# Patient Record
Sex: MALE | Race: WHITE | URBAN - METROPOLITAN AREA
[De-identification: names, ages, dates, MRNs, and addresses within clinical notes are randomized per-mention and may not be internally consistent; named-entity substitution may affect disease eponyms.]

---

## 2017-07-19 ENCOUNTER — EMERGENCY (EMERGENCY)
Facility: HOSPITAL | Age: 72
LOS: 1 days | Discharge: NURSING, REHAB OR LONG-TERM CARE | End: 2017-07-19
Attending: EMERGENCY MEDICINE | Admitting: EMERGENCY MEDICINE
Payer: MEDICARE

## 2017-07-19 VITALS
TEMPERATURE: 99 F | SYSTOLIC BLOOD PRESSURE: 102 MMHG | RESPIRATION RATE: 15 BRPM | WEIGHT: 197.98 LBS | HEART RATE: 68 BPM | OXYGEN SATURATION: 93 % | HEIGHT: 68 IN | DIASTOLIC BLOOD PRESSURE: 69 MMHG

## 2017-07-19 VITALS
RESPIRATION RATE: 16 BRPM | SYSTOLIC BLOOD PRESSURE: 149 MMHG | DIASTOLIC BLOOD PRESSURE: 78 MMHG | OXYGEN SATURATION: 93 % | TEMPERATURE: 98 F | HEART RATE: 67 BPM

## 2017-07-19 DIAGNOSIS — G30.8 OTHER ALZHEIMER'S DISEASE: ICD-10-CM

## 2017-07-19 LAB
ALBUMIN SERPL ELPH-MCNC: 3.1 G/DL — LOW (ref 3.3–5)
ALP SERPL-CCNC: 148 U/L — HIGH (ref 30–120)
ALT FLD-CCNC: 30 U/L DA — SIGNIFICANT CHANGE UP (ref 10–60)
ANION GAP SERPL CALC-SCNC: 11 MMOL/L — SIGNIFICANT CHANGE UP (ref 5–17)
APPEARANCE UR: CLEAR — SIGNIFICANT CHANGE UP
AST SERPL-CCNC: 19 U/L — SIGNIFICANT CHANGE UP (ref 10–40)
BASOPHILS # BLD AUTO: 0.2 K/UL — SIGNIFICANT CHANGE UP (ref 0–0.2)
BASOPHILS NFR BLD AUTO: 1.9 % — SIGNIFICANT CHANGE UP (ref 0–2)
BILIRUB SERPL-MCNC: 0.5 MG/DL — SIGNIFICANT CHANGE UP (ref 0.2–1.2)
BILIRUB UR-MCNC: NEGATIVE — SIGNIFICANT CHANGE UP
BUN SERPL-MCNC: 10 MG/DL — SIGNIFICANT CHANGE UP (ref 7–23)
CALCIUM SERPL-MCNC: 8.7 MG/DL — SIGNIFICANT CHANGE UP (ref 8.4–10.5)
CHLORIDE SERPL-SCNC: 101 MMOL/L — SIGNIFICANT CHANGE UP (ref 96–108)
CO2 SERPL-SCNC: 24 MMOL/L — SIGNIFICANT CHANGE UP (ref 22–31)
COLOR SPEC: YELLOW — SIGNIFICANT CHANGE UP
CREAT SERPL-MCNC: 0.95 MG/DL — SIGNIFICANT CHANGE UP (ref 0.5–1.3)
DIFF PNL FLD: NEGATIVE — SIGNIFICANT CHANGE UP
EOSINOPHIL # BLD AUTO: 0.4 K/UL — SIGNIFICANT CHANGE UP (ref 0–0.5)
EOSINOPHIL NFR BLD AUTO: 3.4 % — SIGNIFICANT CHANGE UP (ref 0–6)
GLUCOSE SERPL-MCNC: 114 MG/DL — HIGH (ref 70–99)
GLUCOSE UR QL: NEGATIVE MG/DL — SIGNIFICANT CHANGE UP
HCT VFR BLD CALC: 51.7 % — HIGH (ref 39–50)
HGB BLD-MCNC: 17.1 G/DL — HIGH (ref 13–17)
KETONES UR-MCNC: NEGATIVE — SIGNIFICANT CHANGE UP
LEUKOCYTE ESTERASE UR-ACNC: ABNORMAL
LYMPHOCYTES # BLD AUTO: 1.8 K/UL — SIGNIFICANT CHANGE UP (ref 1–3.3)
LYMPHOCYTES # BLD AUTO: 15.8 % — SIGNIFICANT CHANGE UP (ref 13–44)
MCHC RBC-ENTMCNC: 30.4 PG — SIGNIFICANT CHANGE UP (ref 27–34)
MCHC RBC-ENTMCNC: 33.1 GM/DL — SIGNIFICANT CHANGE UP (ref 32–36)
MCV RBC AUTO: 91.9 FL — SIGNIFICANT CHANGE UP (ref 80–100)
MONOCYTES # BLD AUTO: 1.1 K/UL — HIGH (ref 0–0.9)
MONOCYTES NFR BLD AUTO: 9.9 % — SIGNIFICANT CHANGE UP (ref 2–14)
NEUTROPHILS # BLD AUTO: 8 K/UL — HIGH (ref 1.8–7.4)
NEUTROPHILS NFR BLD AUTO: 69 % — SIGNIFICANT CHANGE UP (ref 43–77)
NITRITE UR-MCNC: NEGATIVE — SIGNIFICANT CHANGE UP
PH UR: 6.5 — SIGNIFICANT CHANGE UP (ref 5–8)
PLATELET # BLD AUTO: 202 K/UL — SIGNIFICANT CHANGE UP (ref 150–400)
POTASSIUM SERPL-MCNC: 4 MMOL/L — SIGNIFICANT CHANGE UP (ref 3.5–5.3)
POTASSIUM SERPL-SCNC: 4 MMOL/L — SIGNIFICANT CHANGE UP (ref 3.5–5.3)
PROT SERPL-MCNC: 7.4 G/DL — SIGNIFICANT CHANGE UP (ref 6–8.3)
PROT UR-MCNC: NEGATIVE MG/DL — SIGNIFICANT CHANGE UP
RBC # BLD: 5.62 M/UL — SIGNIFICANT CHANGE UP (ref 4.2–5.8)
RBC # FLD: 12.8 % — SIGNIFICANT CHANGE UP (ref 10.3–14.5)
SODIUM SERPL-SCNC: 136 MMOL/L — SIGNIFICANT CHANGE UP (ref 135–145)
SP GR SPEC: 1.01 — SIGNIFICANT CHANGE UP (ref 1.01–1.02)
UROBILINOGEN FLD QL: NEGATIVE MG/DL — SIGNIFICANT CHANGE UP
WBC # BLD: 11.6 K/UL — HIGH (ref 3.8–10.5)
WBC # FLD AUTO: 11.6 K/UL — HIGH (ref 3.8–10.5)

## 2017-07-19 PROCEDURE — 99284 EMERGENCY DEPT VISIT MOD MDM: CPT | Mod: 25

## 2017-07-19 PROCEDURE — 70450 CT HEAD/BRAIN W/O DYE: CPT

## 2017-07-19 PROCEDURE — 71045 X-RAY EXAM CHEST 1 VIEW: CPT

## 2017-07-19 PROCEDURE — 80053 COMPREHEN METABOLIC PANEL: CPT

## 2017-07-19 PROCEDURE — 99284 EMERGENCY DEPT VISIT MOD MDM: CPT

## 2017-07-19 PROCEDURE — 71010: CPT | Mod: 26

## 2017-07-19 PROCEDURE — 70450 CT HEAD/BRAIN W/O DYE: CPT | Mod: 26

## 2017-07-19 PROCEDURE — 81001 URINALYSIS AUTO W/SCOPE: CPT

## 2017-07-19 PROCEDURE — 90791 PSYCH DIAGNOSTIC EVALUATION: CPT

## 2017-07-19 PROCEDURE — 85027 COMPLETE CBC AUTOMATED: CPT

## 2017-07-19 RX ORDER — SODIUM CHLORIDE 9 MG/ML
1000 INJECTION INTRAMUSCULAR; INTRAVENOUS; SUBCUTANEOUS ONCE
Qty: 0 | Refills: 0 | Status: DISCONTINUED | OUTPATIENT
Start: 2017-07-19 | End: 2017-07-19

## 2017-07-19 RX ORDER — CIPROFLOXACIN HCL 0.3 %
1 DROPS OPHTHALMIC (EYE) ONCE
Qty: 0 | Refills: 0 | Status: COMPLETED | OUTPATIENT
Start: 2017-07-19 | End: 2017-07-19

## 2017-07-19 RX ADMIN — Medication 1 DROP(S): at 14:57
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
## 2017-07-19 NOTE — ED BEHAVIORAL HEALTH ASSESSMENT NOTE - SUICIDE PROTECTIVE FACTORS
Supportive social network or family/Future oriented/Fear of death or dying due to pain/suffering/Identifies reasons for living
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
## 2017-07-19 NOTE — ED PROVIDER NOTE - PSYCHIATRIC, MLM
Cooperative in ER, expressing agitation with other residents and staff at his assisted living facility.
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
## 2017-07-19 NOTE — ED PROVIDER NOTE - MUSCULOSKELETAL, MLM
Spine appears normal, range of motion is not limited, no muscle or joint tenderness. No clubbing cyanosis or edema

## 2017-07-19 NOTE — ED PROVIDER NOTE - CONSTITUTIONAL, MLM
normal... Well appearing, well nourished, elderly, awake on stretcher, alert and oriented x 2 and in no apparent distress.

## 2017-07-19 NOTE — ED BEHAVIORAL HEALTH ASSESSMENT NOTE - OTHER
[facility] peers and staff records from [facility] some mild confusion

## 2017-07-19 NOTE — ED ADULT NURSE NOTE - PMH
Anxiety    Atrial fibrillation    Cerebrovascular accident (CVA)    Dementia    Depression    Hypertension

## 2017-07-19 NOTE — ED BEHAVIORAL HEALTH ASSESSMENT NOTE - DESCRIPTION
calm and in control    cooperative with tests and procedures Pacemaker    A-fib   S/P CVA   conjunctivitis bilateral eyes , retired , has supportive people in his live

## 2017-07-19 NOTE — ED BEHAVIORAL HEALTH ASSESSMENT NOTE - RISK ASSESSMENT
Pt is calm and in control, and denies any SI or HI.  Pt lives in a structured, supervised environment.  Pt is low risk for harm to self or others

## 2017-07-19 NOTE — ED PROVIDER NOTE - MEDICAL DECISION MAKING DETAILS
70 y/o M of hx with dementia on Eliquis, increasing agitation - check basic labs, urine, chest XR r/o infection, head CT r/o bleeding, may explain behavioral change, psychiatry consultation 70 y/o M of hx with dementia on Eliquis, increasing agitation - check basic labs, urine, chest XR r/o infection, head CT r/o bleeding, may explain behavioral change, psychiatry consultation--pt well , may d/c back home, drops for conjunctivitis

## 2019-06-25 ENCOUNTER — INPATIENT (INPATIENT)
Facility: HOSPITAL | Age: 74
LOS: 3 days | Discharge: NURSING, REHAB OR LONG-TERM CARE | DRG: 884 | End: 2019-06-29
Attending: INTERNAL MEDICINE | Admitting: INTERNAL MEDICINE
Payer: MEDICARE

## 2019-06-25 VITALS
DIASTOLIC BLOOD PRESSURE: 62 MMHG | HEART RATE: 62 BPM | RESPIRATION RATE: 14 BRPM | OXYGEN SATURATION: 97 % | HEIGHT: 68 IN | TEMPERATURE: 98 F | WEIGHT: 184.97 LBS | SYSTOLIC BLOOD PRESSURE: 148 MMHG

## 2019-06-25 DIAGNOSIS — I48.2 CHRONIC ATRIAL FIBRILLATION: ICD-10-CM

## 2019-06-25 DIAGNOSIS — F03.90 UNSPECIFIED DEMENTIA WITHOUT BEHAVIORAL DISTURBANCE: ICD-10-CM

## 2019-06-25 DIAGNOSIS — J22 UNSPECIFIED ACUTE LOWER RESPIRATORY INFECTION: ICD-10-CM

## 2019-06-25 DIAGNOSIS — R45.1 RESTLESSNESS AND AGITATION: ICD-10-CM

## 2019-06-25 PROBLEM — I63.9 CEREBRAL INFARCTION, UNSPECIFIED: Chronic | Status: ACTIVE | Noted: 2017-07-19

## 2019-06-25 PROBLEM — I48.91 UNSPECIFIED ATRIAL FIBRILLATION: Chronic | Status: ACTIVE | Noted: 2017-07-19

## 2019-06-25 LAB
ALBUMIN SERPL ELPH-MCNC: 3.4 G/DL — SIGNIFICANT CHANGE UP (ref 3.3–5)
ALP SERPL-CCNC: 91 U/L — SIGNIFICANT CHANGE UP (ref 30–120)
ALT FLD-CCNC: 20 U/L DA — SIGNIFICANT CHANGE UP (ref 10–60)
ANION GAP SERPL CALC-SCNC: 1 MMOL/L — LOW (ref 5–17)
APPEARANCE UR: CLEAR — SIGNIFICANT CHANGE UP
APTT BLD: 35.2 SEC — SIGNIFICANT CHANGE UP (ref 28.5–37)
AST SERPL-CCNC: 21 U/L — SIGNIFICANT CHANGE UP (ref 10–40)
BACTERIA # UR AUTO: ABNORMAL
BASOPHILS # BLD AUTO: 0.08 K/UL — SIGNIFICANT CHANGE UP (ref 0–0.2)
BASOPHILS NFR BLD AUTO: 0.9 % — SIGNIFICANT CHANGE UP (ref 0–2)
BILIRUB SERPL-MCNC: 0.7 MG/DL — SIGNIFICANT CHANGE UP (ref 0.2–1.2)
BILIRUB UR-MCNC: NEGATIVE — SIGNIFICANT CHANGE UP
BUN SERPL-MCNC: 11 MG/DL — SIGNIFICANT CHANGE UP (ref 7–23)
CALCIUM SERPL-MCNC: 8.8 MG/DL — SIGNIFICANT CHANGE UP (ref 8.4–10.5)
CHLORIDE SERPL-SCNC: 97 MMOL/L — SIGNIFICANT CHANGE UP (ref 96–108)
CO2 SERPL-SCNC: 32 MMOL/L — HIGH (ref 22–31)
COLOR SPEC: YELLOW — SIGNIFICANT CHANGE UP
CREAT SERPL-MCNC: 0.75 MG/DL — SIGNIFICANT CHANGE UP (ref 0.5–1.3)
DIFF PNL FLD: ABNORMAL
EOSINOPHIL # BLD AUTO: 0.3 K/UL — SIGNIFICANT CHANGE UP (ref 0–0.5)
EOSINOPHIL NFR BLD AUTO: 3.4 % — SIGNIFICANT CHANGE UP (ref 0–6)
EPI CELLS # UR: SIGNIFICANT CHANGE UP
GLUCOSE SERPL-MCNC: 122 MG/DL — HIGH (ref 70–99)
GLUCOSE UR QL: NEGATIVE MG/DL — SIGNIFICANT CHANGE UP
HCT VFR BLD CALC: 47.4 % — SIGNIFICANT CHANGE UP (ref 39–50)
HGB BLD-MCNC: 16.2 G/DL — SIGNIFICANT CHANGE UP (ref 13–17)
IMM GRANULOCYTES NFR BLD AUTO: 0.6 % — SIGNIFICANT CHANGE UP (ref 0–1.5)
INR BLD: 1.16 RATIO — SIGNIFICANT CHANGE UP (ref 0.88–1.16)
KETONES UR-MCNC: NEGATIVE — SIGNIFICANT CHANGE UP
LACTATE SERPL-SCNC: 1.1 MMOL/L — SIGNIFICANT CHANGE UP (ref 0.7–2)
LEUKOCYTE ESTERASE UR-ACNC: ABNORMAL
LYMPHOCYTES # BLD AUTO: 1.27 K/UL — SIGNIFICANT CHANGE UP (ref 1–3.3)
LYMPHOCYTES # BLD AUTO: 14.5 % — SIGNIFICANT CHANGE UP (ref 13–44)
MCHC RBC-ENTMCNC: 30.7 PG — SIGNIFICANT CHANGE UP (ref 27–34)
MCHC RBC-ENTMCNC: 34.2 GM/DL — SIGNIFICANT CHANGE UP (ref 32–36)
MCV RBC AUTO: 89.8 FL — SIGNIFICANT CHANGE UP (ref 80–100)
MONOCYTES # BLD AUTO: 0.66 K/UL — SIGNIFICANT CHANGE UP (ref 0–0.9)
MONOCYTES NFR BLD AUTO: 7.5 % — SIGNIFICANT CHANGE UP (ref 2–14)
NEUTROPHILS # BLD AUTO: 6.41 K/UL — SIGNIFICANT CHANGE UP (ref 1.8–7.4)
NEUTROPHILS NFR BLD AUTO: 73.1 % — SIGNIFICANT CHANGE UP (ref 43–77)
NITRITE UR-MCNC: POSITIVE
NRBC # BLD: 0 /100 WBCS — SIGNIFICANT CHANGE UP (ref 0–0)
PH UR: 6 — SIGNIFICANT CHANGE UP (ref 5–8)
PLATELET # BLD AUTO: 216 K/UL — SIGNIFICANT CHANGE UP (ref 150–400)
POTASSIUM SERPL-MCNC: 4.5 MMOL/L — SIGNIFICANT CHANGE UP (ref 3.5–5.3)
POTASSIUM SERPL-SCNC: 4.5 MMOL/L — SIGNIFICANT CHANGE UP (ref 3.5–5.3)
PROT SERPL-MCNC: 7.1 G/DL — SIGNIFICANT CHANGE UP (ref 6–8.3)
PROT UR-MCNC: NEGATIVE MG/DL — SIGNIFICANT CHANGE UP
PROTHROM AB SERPL-ACNC: 12.7 SEC — SIGNIFICANT CHANGE UP (ref 10–12.9)
RBC # BLD: 5.28 M/UL — SIGNIFICANT CHANGE UP (ref 4.2–5.8)
RBC # FLD: 13.6 % — SIGNIFICANT CHANGE UP (ref 10.3–14.5)
RBC CASTS # UR COMP ASSIST: SIGNIFICANT CHANGE UP /HPF (ref 0–4)
SODIUM SERPL-SCNC: 130 MMOL/L — LOW (ref 135–145)
SP GR SPEC: 1.01 — SIGNIFICANT CHANGE UP (ref 1.01–1.02)
UROBILINOGEN FLD QL: NEGATIVE MG/DL — SIGNIFICANT CHANGE UP
WBC # BLD: 8.77 K/UL — SIGNIFICANT CHANGE UP (ref 3.8–10.5)
WBC # FLD AUTO: 8.77 K/UL — SIGNIFICANT CHANGE UP (ref 3.8–10.5)
WBC UR QL: ABNORMAL

## 2019-06-25 PROCEDURE — 71275 CT ANGIOGRAPHY CHEST: CPT | Mod: 26

## 2019-06-25 PROCEDURE — 93010 ELECTROCARDIOGRAM REPORT: CPT

## 2019-06-25 PROCEDURE — 99285 EMERGENCY DEPT VISIT HI MDM: CPT

## 2019-06-25 PROCEDURE — 71045 X-RAY EXAM CHEST 1 VIEW: CPT | Mod: 26

## 2019-06-25 RX ORDER — DONEPEZIL HYDROCHLORIDE 10 MG/1
10 TABLET, FILM COATED ORAL AT BEDTIME
Refills: 0 | Status: DISCONTINUED | OUTPATIENT
Start: 2019-06-25 | End: 2019-06-26

## 2019-06-25 RX ORDER — APIXABAN 2.5 MG/1
5 TABLET, FILM COATED ORAL EVERY 12 HOURS
Refills: 0 | Status: DISCONTINUED | OUTPATIENT
Start: 2019-06-26 | End: 2019-06-29

## 2019-06-25 RX ORDER — CEFTRIAXONE 500 MG/1
1000 INJECTION, POWDER, FOR SOLUTION INTRAMUSCULAR; INTRAVENOUS ONCE
Refills: 0 | Status: COMPLETED | OUTPATIENT
Start: 2019-06-25 | End: 2019-06-25

## 2019-06-25 RX ORDER — QUETIAPINE FUMARATE 200 MG/1
100 TABLET, FILM COATED ORAL
Refills: 0 | Status: DISCONTINUED | OUTPATIENT
Start: 2019-06-25 | End: 2019-06-25

## 2019-06-25 RX ORDER — CEFTRIAXONE 500 MG/1
1000 INJECTION, POWDER, FOR SOLUTION INTRAMUSCULAR; INTRAVENOUS EVERY 24 HOURS
Refills: 0 | Status: DISCONTINUED | OUTPATIENT
Start: 2019-06-26 | End: 2019-06-26

## 2019-06-25 RX ORDER — KETOROLAC TROMETHAMINE 0.5 %
0.5 DROPS OPHTHALMIC (EYE)
Qty: 0 | Refills: 0 | DISCHARGE

## 2019-06-25 RX ORDER — AZITHROMYCIN 500 MG/1
500 TABLET, FILM COATED ORAL EVERY 24 HOURS
Refills: 0 | Status: DISCONTINUED | OUTPATIENT
Start: 2019-06-26 | End: 2019-06-26

## 2019-06-25 RX ORDER — QUETIAPINE FUMARATE 200 MG/1
100 TABLET, FILM COATED ORAL
Refills: 0 | Status: DISCONTINUED | OUTPATIENT
Start: 2019-06-25 | End: 2019-06-26

## 2019-06-25 RX ORDER — ERGOCALCIFEROL 1.25 MG/1
2000 CAPSULE ORAL
Qty: 0 | Refills: 0 | DISCHARGE

## 2019-06-25 RX ORDER — AZITHROMYCIN 500 MG/1
500 TABLET, FILM COATED ORAL ONCE
Refills: 0 | Status: COMPLETED | OUTPATIENT
Start: 2019-06-25 | End: 2019-06-25

## 2019-06-25 RX ORDER — MIRTAZAPINE 45 MG/1
15 TABLET, ORALLY DISINTEGRATING ORAL AT BEDTIME
Refills: 0 | Status: DISCONTINUED | OUTPATIENT
Start: 2019-06-25 | End: 2019-06-29

## 2019-06-25 RX ORDER — KETOROLAC TROMETHAMINE 0.5 %
1 DROPS OPHTHALMIC (EYE) DAILY
Refills: 0 | Status: DISCONTINUED | OUTPATIENT
Start: 2019-06-25 | End: 2019-06-29

## 2019-06-25 RX ORDER — CARVEDILOL PHOSPHATE 80 MG/1
12.5 CAPSULE, EXTENDED RELEASE ORAL EVERY 12 HOURS
Refills: 0 | Status: DISCONTINUED | OUTPATIENT
Start: 2019-06-25 | End: 2019-06-29

## 2019-06-25 RX ORDER — QUETIAPINE FUMARATE 200 MG/1
0 TABLET, FILM COATED ORAL
Qty: 0 | Refills: 0 | DISCHARGE

## 2019-06-25 RX ADMIN — MIRTAZAPINE 15 MILLIGRAM(S): 45 TABLET, ORALLY DISINTEGRATING ORAL at 21:23

## 2019-06-25 RX ADMIN — CARVEDILOL PHOSPHATE 12.5 MILLIGRAM(S): 80 CAPSULE, EXTENDED RELEASE ORAL at 17:02

## 2019-06-25 RX ADMIN — DONEPEZIL HYDROCHLORIDE 10 MILLIGRAM(S): 10 TABLET, FILM COATED ORAL at 21:23

## 2019-06-25 RX ADMIN — Medication 1 DROP(S): at 17:04

## 2019-06-25 RX ADMIN — Medication 1 DROP(S): at 16:59

## 2019-06-25 RX ADMIN — QUETIAPINE FUMARATE 100 MILLIGRAM(S): 200 TABLET, FILM COATED ORAL at 17:02

## 2019-06-25 RX ADMIN — Medication 10 MILLIGRAM(S): at 16:59

## 2019-06-25 RX ADMIN — CEFTRIAXONE 100 MILLIGRAM(S): 500 INJECTION, POWDER, FOR SOLUTION INTRAMUSCULAR; INTRAVENOUS at 11:13

## 2019-06-25 RX ADMIN — AZITHROMYCIN 255 MILLIGRAM(S): 500 TABLET, FILM COATED ORAL at 12:14
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
## 2019-06-25 NOTE — ED ADULT NURSE REASSESSMENT NOTE - NS ED NURSE REASSESS COMMENT FT1
1106- Lab work done and resulted, pt will be started on IV abx. pt to be admitted for UTI awaiting for orders and bed placement pt and  updated in plan fo care verbalizes understanding. ALLY hunter

## 2019-06-25 NOTE — CONSULT NOTE ADULT - SUBJECTIVE AND OBJECTIVE BOX
History of Present Illness: The patient is a 73 year old male with a history of HTN, CVA, AF, ICD, dementia who presents with agitation. The patient is a poor historian. The patient notes an intermittent cough but denies chest pain, shortness of breath.    Past Medical/Surgical History:  HTN, CVA, AF, ICD, dementia    Medications:  Home Medications:  Artificial Tears Plus ophthalmic solution: 1 drop(s) to each affected eye 2 times a day (25 Jun 2019 10:22)  Coreg 12.5 mg oral tablet: 1 tab(s) orally 2 times a day (25 Jun 2019 09:11)  donepezil 10 mg oral tablet: 1 tab(s) orally once a day (at bedtime) (25 Jun 2019 09:11)  Eliquis 2.5 mg oral tablet: 1 tab(s) orally 2 times a day (25 Jun 2019 09:11)  enalapril 10 mg oral tablet: 1 tab(s) orally once a day (25 Jun 2019 09:11)  hydroCHLOROthiazide 12.5 mg oral tablet: 1 tab(s) orally once a day (25 Jun 2019 10:22)  ketorolac ophthalmic: 0.5  to each affected eye (25 Jun 2019 10:22)  mirtazapine 15 mg oral tablet: 1 tab(s) orally once a day (at bedtime) (25 Jun 2019 09:11)  Mobic 7.5 mg oral tablet: 1 tab(s) orally once a day (25 Jun 2019 10:22)  QUEtiapine 100 mg oral tablet: 1 tab(s) orally 2 times a day (25 Jun 2019 10:22)  Vitamin D2: 2000 unit(s) orally once a day (25 Jun 2019 09:11)      Family History: Non-contributory family history of premature cardiovascular atherosclerotic disease    Social History: No tobacco, alcohol or drug use    Review of Systems:  General: No fevers, chills, weight loss or gain  Skin: No rashes, color changes  Cardiovascular: No chest pain, orthopnea  Respiratory: No shortness of breath, cough  Gastrointestinal: No nausea, abdominal pain  Genitourinary: No incontinence, pain with urination  Musculoskeletal: No pain, swelling, decreased range of motion  Neurological: No headache, weakness  Psychiatric: No depression, anxiety  Endocrine: No weight loss or gain, increased thirst  All other systems are comprehensively negative.    Physical Exam:  Vitals:        Vital Signs Last 24 Hrs  T(C): 36.4 (25 Jun 2019 08:53), Max: 36.4 (25 Jun 2019 08:53)  T(F): 97.5 (25 Jun 2019 08:53), Max: 97.5 (25 Jun 2019 08:53)  HR: 62 (25 Jun 2019 08:53) (62 - 62)  BP: 148/62 (25 Jun 2019 08:53) (148/62 - 148/62)  BP(mean): --  RR: 14 (25 Jun 2019 08:53) (14 - 14)  SpO2: 97% (25 Jun 2019 08:53) (97% - 97%)  General: NAD  HEENT: MMM  Neck: No JVD, no carotid bruit  Lungs: CTAB  CV: RRR, nl S1/S2, no M/R/G  Abdomen: S/NT/ND, +BS  Extremities: No LE edema, no cyanosis  Neuro: AAOx3, non-focal  Skin: No rash    Labs:                        16.2   8.77  )-----------( 216      ( 25 Jun 2019 09:37 )             47.4     06-25    130<L>  |  97  |  11  ----------------------------<  122<H>  4.5   |  32<H>  |  0.75    Ca    8.8      25 Jun 2019 09:37    TPro  7.1  /  Alb  3.4  /  TBili  0.7  /  DBili  x   /  AST  21  /  ALT  20  /  AlkPhos  91  06-25        PT/INR - ( 25 Jun 2019 09:37 )   PT: 12.7 sec;   INR: 1.16 ratio         PTT - ( 25 Jun 2019 09:37 )  PTT:35.2 sec    ECG: NSR, ventricular paced

## 2019-06-25 NOTE — H&P ADULT - NSICDXPASTMEDICALHX_GEN_ALL_CORE_FT
PAST MEDICAL HISTORY:  Anxiety     Atrial fibrillation     Cerebrovascular accident (CVA)     Dementia     Depression     Hypertension

## 2019-06-25 NOTE — CONSULT NOTE ADULT - PROBLEM SELECTOR RECOMMENDATION 9
eval for PNA  will check procalcitonin, crp, tsh,   will check CTA chest - eval VTE and PNA - pulm disease  swallow eval - hx of dementia and cva  cxr and labs reviewed with pt and HCP at the bedside  HOB elev  asp prec  oral hygiene  assist with ADL as needed  fall prec  monitor vs and HD and Sat  rocephin and zithro ABX - until more clinical data available, and biomarkers and cx  will follow  robitussin for cough PRN -
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
## 2019-06-25 NOTE — SWALLOW BEDSIDE ASSESSMENT ADULT - ORAL PHASE
Within functional limits Delayed oral transit time/stasis clears given liquid wash/Lingual stasis/Decreased anterior-posterior movement of the bolus

## 2019-06-25 NOTE — ED ADULT NURSE NOTE - NSIMPLEMENTINTERV_GEN_ALL_ED
Implemented All Fall with Harm Risk Interventions:  [place] to call system. Call bell, personal items and telephone within reach. Instruct patient to call for assistance. Room bathroom lighting operational. Non-slip footwear when patient is off stretcher. Physically safe environment: no spills, clutter or unnecessary equipment. Stretcher in lowest position, wheels locked, appropriate side rails in place. Provide visual cue, wrist band, yellow gown, etc. Monitor gait and stability. Monitor for mental status changes and reorient to person, place, and time. Review medications for side effects contributing to fall risk. Reinforce activity limits and safety measures with patient and family. Provide visual clues: red socks.

## 2019-06-25 NOTE — ED PROVIDER NOTE - OBJECTIVE STATEMENT
pt bib ems from [facility] for agitation since overnight. pt denies pain sob, nausea. per aide, no vomiting, diarrhea, cough. no further hx available.  pmd - cain
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
## 2019-06-25 NOTE — ED PROVIDER NOTE - CARDIAC, MLM
Normal rate, regular rhythm.  Heart sounds S1, S2 Normal rate, regular rhythm.  Heart sounds S1, S2. PPM left ACW

## 2019-06-25 NOTE — CONSULT NOTE ADULT - ASSESSMENT
The patient is a 73 year old male with a history of HTN, CVA, AF, ICD, dementia who presents with agitation.    Plan:  - Continue carvedilol 12.5 mg bid  - Continue apixaban 5 mg bid (based on weight and renal function)  - Continue enalapril 10 mg daily  - On ceftriaxone and azithromycin for PNA  - Pulm follow-up

## 2019-06-25 NOTE — H&P ADULT - ATTENDING COMMENTS
60minutes spent on this visit, 50% visit time spent in care co-ordination with other attendings and counselling patient  I have discussed care plan with patient and HCP,expressed understanding of problems treatment and their effect and side effects, alternatives in detail,I have asked if they have any questions and concerns and appropriately addressed them to best of my ability  Reviewed all diagonostic tests, lab results and drug drug interactions, and medications

## 2019-06-26 DIAGNOSIS — F03.91 UNSPECIFIED DEMENTIA WITH BEHAVIORAL DISTURBANCE: ICD-10-CM

## 2019-06-26 LAB
ANION GAP SERPL CALC-SCNC: 5 MMOL/L — SIGNIFICANT CHANGE UP (ref 5–17)
BUN SERPL-MCNC: 14 MG/DL — SIGNIFICANT CHANGE UP (ref 7–23)
CALCIUM SERPL-MCNC: 9 MG/DL — SIGNIFICANT CHANGE UP (ref 8.4–10.5)
CHLORIDE SERPL-SCNC: 100 MMOL/L — SIGNIFICANT CHANGE UP (ref 96–108)
CO2 SERPL-SCNC: 31 MMOL/L — SIGNIFICANT CHANGE UP (ref 22–31)
CREAT SERPL-MCNC: 0.92 MG/DL — SIGNIFICANT CHANGE UP (ref 0.5–1.3)
CRP SERPL-MCNC: 0.32 MG/DL — SIGNIFICANT CHANGE UP (ref 0–0.4)
GLUCOSE SERPL-MCNC: 101 MG/DL — HIGH (ref 70–99)
HCT VFR BLD CALC: 49.4 % — SIGNIFICANT CHANGE UP (ref 39–50)
HGB BLD-MCNC: 16.8 G/DL — SIGNIFICANT CHANGE UP (ref 13–17)
MAGNESIUM SERPL-MCNC: 1.6 MG/DL — SIGNIFICANT CHANGE UP (ref 1.6–2.6)
MCHC RBC-ENTMCNC: 30.6 PG — SIGNIFICANT CHANGE UP (ref 27–34)
MCHC RBC-ENTMCNC: 34 GM/DL — SIGNIFICANT CHANGE UP (ref 32–36)
MCV RBC AUTO: 90 FL — SIGNIFICANT CHANGE UP (ref 80–100)
NRBC # BLD: 0 /100 WBCS — SIGNIFICANT CHANGE UP (ref 0–0)
PLATELET # BLD AUTO: 211 K/UL — SIGNIFICANT CHANGE UP (ref 150–400)
POTASSIUM SERPL-MCNC: 3.8 MMOL/L — SIGNIFICANT CHANGE UP (ref 3.5–5.3)
POTASSIUM SERPL-SCNC: 3.8 MMOL/L — SIGNIFICANT CHANGE UP (ref 3.5–5.3)
PROCALCITONIN SERPL-MCNC: <0.02 NG/ML — SIGNIFICANT CHANGE UP (ref 0.02–0.1)
RBC # BLD: 5.49 M/UL — SIGNIFICANT CHANGE UP (ref 4.2–5.8)
RBC # FLD: 13.8 % — SIGNIFICANT CHANGE UP (ref 10.3–14.5)
SODIUM SERPL-SCNC: 136 MMOL/L — SIGNIFICANT CHANGE UP (ref 135–145)
WBC # BLD: 8.78 K/UL — SIGNIFICANT CHANGE UP (ref 3.8–10.5)
WBC # FLD AUTO: 8.78 K/UL — SIGNIFICANT CHANGE UP (ref 3.8–10.5)

## 2019-06-26 RX ORDER — DONEPEZIL HYDROCHLORIDE 10 MG/1
10 TABLET, FILM COATED ORAL DAILY
Refills: 0 | Status: DISCONTINUED | OUTPATIENT
Start: 2019-06-26 | End: 2019-06-29

## 2019-06-26 RX ORDER — QUETIAPINE FUMARATE 200 MG/1
75 TABLET, FILM COATED ORAL THREE TIMES A DAY
Refills: 0 | Status: DISCONTINUED | OUTPATIENT
Start: 2019-06-26 | End: 2019-06-26

## 2019-06-26 RX ORDER — QUETIAPINE FUMARATE 200 MG/1
100 TABLET, FILM COATED ORAL THREE TIMES A DAY
Refills: 0 | Status: DISCONTINUED | OUTPATIENT
Start: 2019-06-26 | End: 2019-06-29

## 2019-06-26 RX ADMIN — QUETIAPINE FUMARATE 100 MILLIGRAM(S): 200 TABLET, FILM COATED ORAL at 21:30

## 2019-06-26 RX ADMIN — Medication 1 DROP(S): at 17:11

## 2019-06-26 RX ADMIN — APIXABAN 5 MILLIGRAM(S): 2.5 TABLET, FILM COATED ORAL at 06:01

## 2019-06-26 RX ADMIN — Medication 1 DROP(S): at 11:04

## 2019-06-26 RX ADMIN — CARVEDILOL PHOSPHATE 12.5 MILLIGRAM(S): 80 CAPSULE, EXTENDED RELEASE ORAL at 06:01

## 2019-06-26 RX ADMIN — CARVEDILOL PHOSPHATE 12.5 MILLIGRAM(S): 80 CAPSULE, EXTENDED RELEASE ORAL at 17:10

## 2019-06-26 RX ADMIN — QUETIAPINE FUMARATE 100 MILLIGRAM(S): 200 TABLET, FILM COATED ORAL at 06:02

## 2019-06-26 RX ADMIN — Medication 10 MILLIGRAM(S): at 11:04

## 2019-06-26 RX ADMIN — MIRTAZAPINE 15 MILLIGRAM(S): 45 TABLET, ORALLY DISINTEGRATING ORAL at 21:30

## 2019-06-26 RX ADMIN — QUETIAPINE FUMARATE 100 MILLIGRAM(S): 200 TABLET, FILM COATED ORAL at 16:36

## 2019-06-26 RX ADMIN — DONEPEZIL HYDROCHLORIDE 10 MILLIGRAM(S): 10 TABLET, FILM COATED ORAL at 16:36

## 2019-06-26 RX ADMIN — Medication 1 DROP(S): at 06:01

## 2019-06-26 RX ADMIN — APIXABAN 5 MILLIGRAM(S): 2.5 TABLET, FILM COATED ORAL at 17:10

## 2019-06-26 NOTE — PHYSICAL THERAPY INITIAL EVALUATION ADULT - ADDITIONAL COMMENTS
Pt lives at [facility], there are elevators that the pts use to transfer. Pt has assist and uses RW 70% with all ambulation, transfer, ADLs, and work/leisure activity.

## 2019-06-26 NOTE — BEHAVIORAL HEALTH ASSESSMENT NOTE - NSBHCHARTREVIEWVS_PSY_A_CORE FT
Vital Signs Last 24 Hrs  T(C): 36.8 (26 Jun 2019 07:56), Max: 37.2 (25 Jun 2019 17:04)  T(F): 98.3 (26 Jun 2019 07:56), Max: 98.9 (25 Jun 2019 17:04)  HR: 62 (26 Jun 2019 11:13) (61 - 73)  BP: 131/68 (26 Jun 2019 11:13) (91/58 - 165/83)  BP(mean): --  RR: 18 (26 Jun 2019 07:56) (16 - 18)  SpO2: 99% (26 Jun 2019 07:56) (95% - 99%)

## 2019-06-26 NOTE — BEHAVIORAL HEALTH ASSESSMENT NOTE - NSBHCHARTREVIEWINVESTIGATE_PSY_A_CORE FT
Ventricular Rate 62 BPM    Atrial Rate 62 BPM    P-R Interval 196 ms    QRS Duration 158 ms    Q-T Interval 472 ms    QTC Calculation(Bezet) 479 ms    P Axis 44 degrees    R Axis -67 degrees    T Axis 52 degrees    Diagnosis Line Atrial-sensed ventricular-paced rhythm  Abnormal ECG  No previous ECGs available  Confirmed by Korey Lozada MD ([id number]) on 6/26/2019 8:41:44 AM

## 2019-06-26 NOTE — BEHAVIORAL HEALTH ASSESSMENT NOTE - HPI (INCLUDE ILLNESS QUALITY, SEVERITY, DURATION, TIMING, CONTEXT, MODIFYING FACTORS, ASSOCIATED SIGNS AND SYMPTOMS)
Pt is a 73 yr old man from [facility] with PMH of  AFIB, AICD, dementia with behavioural disorder, CVA, HTN, BIB EMS from [facility] for agitation since overnight. Pt has h/o recurrent hospital visits for aggressive behaviour. Pt is currently verbal and pleasant, and cooperative with the interview.  He reports that his sleep and appetite are good, and he is ok with living at [facility].  Pt denies any SI/HI/intent to harm self or others.  Pt was admitted for AMS likely worsening Dementia with behavioural disorder with possible LLL infiltrate, advised CT chest by Pulmonary.  The medical team did address advanced care planning and discussed with the patient and family.

## 2019-06-26 NOTE — BEHAVIORAL HEALTH ASSESSMENT NOTE - NSBHCHARTREVIEWLAB_PSY_A_CORE FT
16.8   8.78  )-----------( 211      ( 26 Jun 2019 07:59 )             49.4   06-26    136  |  100  |  14  ----------------------------<  101<H>  3.8   |  31  |  0.92    Ca    9.0      26 Jun 2019 07:59  Mg     1.6     06-26    TPro  7.1  /  Alb  3.4  /  TBili  0.7  /  DBili  x   /  AST  21  /  ALT  20  /  AlkPhos  91  06-25
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
## 2019-06-26 NOTE — CONSULT NOTE ADULT - CONSULT REASON
Intensive Care Unit   Advanced Practice Exam & Daily Communication Note    Patient Active Problem List   Diagnosis     RDS (respiratory distress syndrome in the )     Duodenal atresia     Malnutrition (H)     Need for observation and evaluation of  for sepsis     Hand anomaly     SGA (small for gestational age)      , gestational age 33 completed weeks     Twin birth     Temperature instability in      Trisomy 21     PICC (peripherally inserted central catheter) in place -2018       Vital Signs:  Temp:  [97.8  F (36.6  C)-98.4  F (36.9  C)] 98.4  F (36.9  C)  Heart Rate:  [131-154] 133  Resp:  [34-61] 34  BP: (59-87)/(44-49) 59/49  SpO2:  [100 %] 100 %    Weight:  Wt Readings from Last 1 Encounters:   18 2.68 kg (5 lb 14.5 oz) (<1 %)*     * Growth percentiles are based on Down Syndrome (0-36 Months) data.         Physical Exam:  General: Resting comfortably in crib. In no acute distress.  HEENT: Normocephalic. Anterior fontanelle large,soft, flat. Sagittal suture slightly  approx 4mm; unchanged from baseline.  Eyes clear of drainage.   Cardiovascular: RRR. No murmur. Peripheral pulses present, and symmetric. Extremities warm. Capillary refill <3 seconds peripherally and centrally.     Respiratory: Breath sounds clear with good aeration bilaterally.  No retractions or nasal flaring noted.   Gastrointestinal: Abdomen full, soft. Active bowel sounds.   Musculoskeletal: Right hand underdeveloped with remnants of digits, palpable bony wrist.  No other gross deformities present.  Skin: Warm, pink. No jaundice or skin breakdown.    Neurologic: Tone and reflexes symmetric and appropriate for gestation.     Parent Communication:    Updated mother at bedside after rounds.     Extended Emergency Contact Information  Primary Emergency Contact: ROSELIA IZQUIERDO  Home Phone: [phone]  Mobile Phone: [phone]  Relation: Mother    Whit Navas Mustapha, APRN  CNP                           .

## 2019-06-26 NOTE — BEHAVIORAL HEALTH ASSESSMENT NOTE - NSBHCONSULTFOLLOWAFTERCARE_PSY_A_CORE FT
pt may be seen at the [facility] or walk in clinic [phone] or may f/u with his outpt  psychiatrist he has seen in the past.

## 2019-06-26 NOTE — CONSULT NOTE ADULT - SUBJECTIVE AND OBJECTIVE BOX
change in mental status,   For changes in mental status suspect most likely this  dementia made worse in hospital setting seroquel 75 tid adjust as needed   history of dementia continue home medications   called contact number no response

## 2019-06-26 NOTE — BEHAVIORAL HEALTH ASSESSMENT NOTE - PRIMARY DX
Dementia in Alzheimer's disease, atypical or mixed type Dementia with behavioral disturbance, unspecified dementia type

## 2019-06-27 LAB
-  AMIKACIN: SIGNIFICANT CHANGE UP
-  AMPICILLIN/SULBACTAM: SIGNIFICANT CHANGE UP
-  AMPICILLIN: SIGNIFICANT CHANGE UP
-  AZTREONAM: SIGNIFICANT CHANGE UP
-  CEFEPIME: SIGNIFICANT CHANGE UP
-  CEFOXITIN: SIGNIFICANT CHANGE UP
-  CEFTRIAXONE: SIGNIFICANT CHANGE UP
-  CIPROFLOXACIN: SIGNIFICANT CHANGE UP
-  ERTAPENEM: SIGNIFICANT CHANGE UP
-  GENTAMICIN: SIGNIFICANT CHANGE UP
-  IMIPENEM: SIGNIFICANT CHANGE UP
-  LEVOFLOXACIN: SIGNIFICANT CHANGE UP
-  MEROPENEM: SIGNIFICANT CHANGE UP
-  NITROFURANTOIN: SIGNIFICANT CHANGE UP
-  PIPERACILLIN/TAZOBACTAM: SIGNIFICANT CHANGE UP
-  TIGECYCLINE: SIGNIFICANT CHANGE UP
-  TOBRAMYCIN: SIGNIFICANT CHANGE UP
-  TRIMETHOPRIM/SULFAMETHOXAZOLE: SIGNIFICANT CHANGE UP
CULTURE RESULTS: SIGNIFICANT CHANGE UP
FOLATE SERPL-MCNC: 13 NG/ML — SIGNIFICANT CHANGE UP
METHOD TYPE: SIGNIFICANT CHANGE UP
ORGANISM # SPEC MICROSCOPIC CNT: SIGNIFICANT CHANGE UP
ORGANISM # SPEC MICROSCOPIC CNT: SIGNIFICANT CHANGE UP
SPECIMEN SOURCE: SIGNIFICANT CHANGE UP
T PALLIDUM AB TITR SER: NEGATIVE — SIGNIFICANT CHANGE UP
TSH SERPL-MCNC: 2.55 UIU/ML — SIGNIFICANT CHANGE UP (ref 0.27–4.2)
VIT B12 SERPL-MCNC: 546 PG/ML — SIGNIFICANT CHANGE UP (ref 232–1245)

## 2019-06-27 RX ADMIN — APIXABAN 5 MILLIGRAM(S): 2.5 TABLET, FILM COATED ORAL at 05:51

## 2019-06-27 RX ADMIN — Medication 1 DROP(S): at 17:57

## 2019-06-27 RX ADMIN — CARVEDILOL PHOSPHATE 12.5 MILLIGRAM(S): 80 CAPSULE, EXTENDED RELEASE ORAL at 17:58

## 2019-06-27 RX ADMIN — DONEPEZIL HYDROCHLORIDE 10 MILLIGRAM(S): 10 TABLET, FILM COATED ORAL at 12:15

## 2019-06-27 RX ADMIN — QUETIAPINE FUMARATE 100 MILLIGRAM(S): 200 TABLET, FILM COATED ORAL at 05:51

## 2019-06-27 RX ADMIN — Medication 1 DROP(S): at 05:50

## 2019-06-27 RX ADMIN — QUETIAPINE FUMARATE 100 MILLIGRAM(S): 200 TABLET, FILM COATED ORAL at 22:08

## 2019-06-27 RX ADMIN — Medication 1 DROP(S): at 12:15

## 2019-06-27 RX ADMIN — APIXABAN 5 MILLIGRAM(S): 2.5 TABLET, FILM COATED ORAL at 17:57

## 2019-06-27 RX ADMIN — CARVEDILOL PHOSPHATE 12.5 MILLIGRAM(S): 80 CAPSULE, EXTENDED RELEASE ORAL at 05:51

## 2019-06-27 RX ADMIN — MIRTAZAPINE 15 MILLIGRAM(S): 45 TABLET, ORALLY DISINTEGRATING ORAL at 22:08

## 2019-06-27 RX ADMIN — Medication 10 MILLIGRAM(S): at 05:51

## 2019-06-27 RX ADMIN — QUETIAPINE FUMARATE 100 MILLIGRAM(S): 200 TABLET, FILM COATED ORAL at 17:58

## 2019-06-27 NOTE — DISCHARGE NOTE NURSING/CASE MANAGEMENT/SOCIAL WORK - NSDCCRNAME_GEN_ALL_CORE_FT
[facility] [phone]. Utilizes [employer] [phone]

## 2019-06-27 NOTE — PROGRESS NOTE BEHAVIORAL HEALTH - NSBHCONSULTFOLLOWAFTERCARE_PSY_A_CORE FT
pt may be seen at the [facility] or walk in clinic [phone] or may f/u with his outpt  psychiatrist he has seen in the past.

## 2019-06-27 NOTE — PROGRESS NOTE BEHAVIORAL HEALTH - SUMMARY
Pt is a 73 yr old man from [facility] with PMH of  AFIB, AICD, dementia with behavioural disorder, CVA, HTN, BIB EMS from [facility] for agitation since overnight. Pt has h/o recurrent hospital visits for aggressive behaviour. Pt is currently verbal and pleasant, and cooperative with the interview.  He reports that his sleep and appetite are good, and he is ok with living at [facility].  Pt denies any SI/HI/intent to harm self or others.  Pt was admitted for AMS likely worsening Dementia with behavioural disorder and pneumonia which was ruled out now.  Pt's Seroquel was increased to 75mg TID, but he is tolerating this dose with no adverse effects and no sedation.  He may be on Seroquel 100mg po TID to simplify the regimen. (Seroquel 100mg is one tablet and may be easier to take. )   Plan:  Change Donepezil to AM instead to hs to avoid worsening sleep disorder.  Increase Seroquel to 100mg po TID.  Continue Remeron 15mg po qhs.

## 2019-06-27 NOTE — DISCHARGE NOTE NURSING/CASE MANAGEMENT/SOCIAL WORK - NSDCDPATPORTLINK_GEN_ALL_CORE
You can access the [facility] Patient Portal, offered by [facility], by registering with the following website: [email or web address]

## 2019-06-27 NOTE — DISCHARGE NOTE NURSING/CASE MANAGEMENT/SOCIAL WORK - CASE MANAGER'S NAME
Tia Garland RN [phone] Tia Garland RN [phone] or [facility] # [phone]

## 2019-06-27 NOTE — PROGRESS NOTE BEHAVIORAL HEALTH - NSBHFUPINTERVALHXFT_PSY_A_CORE
PT is intermittently irritable and angry, but is responsive to support and is able to be calm.  He has been adherent to his medications and is tolerating the increased dose of Seroquel with no excess sedation.

## 2019-06-27 NOTE — PROGRESS NOTE BEHAVIORAL HEALTH - NSBHCHARTREVIEWVS_PSY_A_CORE FT
Vital Signs Last 24 Hrs  T(C): 36.7 (27 Jun 2019 08:30), Max: 36.8 (26 Jun 2019 21:55)  T(F): 98 (27 Jun 2019 08:30), Max: 98.3 (27 Jun 2019 05:30)  HR: 62 (27 Jun 2019 08:30) (61 - 65)  BP: 131/77 (27 Jun 2019 08:30) (113/75 - 141/75)  BP(mean): --  RR: 18 (27 Jun 2019 08:30) (18 - 18)  SpO2: 98% (27 Jun 2019 08:30) (93% - 98%)

## 2019-06-28 LAB
ANION GAP SERPL CALC-SCNC: 10 MMOL/L — SIGNIFICANT CHANGE UP (ref 5–17)
BUN SERPL-MCNC: 16 MG/DL — SIGNIFICANT CHANGE UP (ref 7–23)
CALCIUM SERPL-MCNC: 9.2 MG/DL — SIGNIFICANT CHANGE UP (ref 8.4–10.5)
CHLORIDE SERPL-SCNC: 102 MMOL/L — SIGNIFICANT CHANGE UP (ref 96–108)
CO2 SERPL-SCNC: 24 MMOL/L — SIGNIFICANT CHANGE UP (ref 22–31)
CREAT SERPL-MCNC: 0.8 MG/DL — SIGNIFICANT CHANGE UP (ref 0.5–1.3)
GLUCOSE SERPL-MCNC: 103 MG/DL — HIGH (ref 70–99)
HCT VFR BLD CALC: 48.3 % — SIGNIFICANT CHANGE UP (ref 39–50)
HGB BLD-MCNC: 16.6 G/DL — SIGNIFICANT CHANGE UP (ref 13–17)
MCHC RBC-ENTMCNC: 31.3 PG — SIGNIFICANT CHANGE UP (ref 27–34)
MCHC RBC-ENTMCNC: 34.4 GM/DL — SIGNIFICANT CHANGE UP (ref 32–36)
MCV RBC AUTO: 91 FL — SIGNIFICANT CHANGE UP (ref 80–100)
NRBC # BLD: 0 /100 WBCS — SIGNIFICANT CHANGE UP (ref 0–0)
PLATELET # BLD AUTO: 202 K/UL — SIGNIFICANT CHANGE UP (ref 150–400)
POTASSIUM SERPL-MCNC: 4.2 MMOL/L — SIGNIFICANT CHANGE UP (ref 3.5–5.3)
POTASSIUM SERPL-SCNC: 4.2 MMOL/L — SIGNIFICANT CHANGE UP (ref 3.5–5.3)
RBC # BLD: 5.31 M/UL — SIGNIFICANT CHANGE UP (ref 4.2–5.8)
RBC # FLD: 14.1 % — SIGNIFICANT CHANGE UP (ref 10.3–14.5)
SODIUM SERPL-SCNC: 136 MMOL/L — SIGNIFICANT CHANGE UP (ref 135–145)
WBC # BLD: 8.91 K/UL — SIGNIFICANT CHANGE UP (ref 3.8–10.5)
WBC # FLD AUTO: 8.91 K/UL — SIGNIFICANT CHANGE UP (ref 3.8–10.5)

## 2019-06-28 RX ADMIN — DONEPEZIL HYDROCHLORIDE 10 MILLIGRAM(S): 10 TABLET, FILM COATED ORAL at 11:41

## 2019-06-28 RX ADMIN — QUETIAPINE FUMARATE 100 MILLIGRAM(S): 200 TABLET, FILM COATED ORAL at 21:39

## 2019-06-28 RX ADMIN — CARVEDILOL PHOSPHATE 12.5 MILLIGRAM(S): 80 CAPSULE, EXTENDED RELEASE ORAL at 06:20

## 2019-06-28 RX ADMIN — MIRTAZAPINE 15 MILLIGRAM(S): 45 TABLET, ORALLY DISINTEGRATING ORAL at 21:39

## 2019-06-28 RX ADMIN — Medication 1 DROP(S): at 18:47

## 2019-06-28 RX ADMIN — QUETIAPINE FUMARATE 100 MILLIGRAM(S): 200 TABLET, FILM COATED ORAL at 06:20

## 2019-06-28 RX ADMIN — QUETIAPINE FUMARATE 100 MILLIGRAM(S): 200 TABLET, FILM COATED ORAL at 13:44

## 2019-06-28 RX ADMIN — APIXABAN 5 MILLIGRAM(S): 2.5 TABLET, FILM COATED ORAL at 06:20

## 2019-06-28 RX ADMIN — APIXABAN 5 MILLIGRAM(S): 2.5 TABLET, FILM COATED ORAL at 18:47

## 2019-06-28 RX ADMIN — Medication 1 DROP(S): at 06:25

## 2019-06-28 RX ADMIN — Medication 1 DROP(S): at 11:41

## 2019-06-28 RX ADMIN — Medication 10 MILLIGRAM(S): at 06:20

## 2019-06-28 RX ADMIN — CARVEDILOL PHOSPHATE 12.5 MILLIGRAM(S): 80 CAPSULE, EXTENDED RELEASE ORAL at 17:28

## 2019-06-28 NOTE — SWALLOW BEDSIDE ASSESSMENT ADULT - SWALLOW EVAL: RECOMMENDED FEEDING/EATING TECHNIQUES
position upright (90 degrees)/oral hygiene/maintain upright posture during/after eating for 30 mins/allow for swallow between intakes/crush medication (when feasible)/no straws/small sips/bites
oral hygiene/small sips/bites/allow for swallow between intakes/check mouth frequently for oral residue/pocketing/crush medication (when feasible)/hard swallow w/ each bite or sip/no straws/maintain upright posture during/after eating for 30 mins/position upright (90 degrees)/alternate food with liquid

## 2019-06-28 NOTE — SWALLOW BEDSIDE ASSESSMENT ADULT - SWALLOW EVAL: DIAGNOSIS
1. Functional oral management of puree, solids and thin liquids with adequate collection, transfer and transport. 2. Functional pharyngeal swallow for puree, solids and thin liquids marked by timely swallow trigger with adequate laryngeal elevation upon palpation. No clinical evidence of airway penetration during this assessment.
1- functional oral management given puree, nectar thick and thin liquid textures marked by adequate bolus collection, transfer and transport. 2- mild oral dysphagia given solids marked by delayed bolus collection, transfer and transport. trace lingual residue remained post swallow which pt clears given liquid wash. 3- mild pharyngeal dysphagia given solid, puree, nectar thick liquid and thin liquid textures marked by delayed swallow trigger and reduced hyolaryngeal excursion without any overt s/s of penetration/aspiration noted.

## 2019-06-28 NOTE — SWALLOW BEDSIDE ASSESSMENT ADULT - ASR SWALLOW ASPIRATION MONITOR
oral hygiene/position upright (90Y)/throat clearing/change of breathing pattern/cough/gurgly voice/fever/pneumonia/upper respiratory infection
oral hygiene/position upright (90Y)/pneumonia/gurgly voice/change of breathing pattern/cough/fever/throat clearing/upper respiratory infection

## 2019-06-28 NOTE — SWALLOW BEDSIDE ASSESSMENT ADULT - COMMENTS
Patient seen at chairside, sitting comfortably. Patient alert and cooperative.
Pt was awake and cooperative for a clinical assessment of swallow function this PM. Per charting, pt is a "73 yr old from [facility] with PMH of  AFIB, AICD ANXIETY, dementia with behavioural disorder, CVA, HTN, bib ems from [facility] for agitation since overnight. pt denies pain sob, nausea. per aide, no vomiting, diarrhea, cough. no further hx available. Has H/o recurrent hospital visits for aggressive behaviour. In /62, RR 14, afebrile, confused , X ray chest left basal infiltrate, pt is on eliquis. HCP by bed side, aware of aggressive behaviour of patient. Admitted for AMS likely worsening Dementia with behavioural disorder with possible LLL infiltrate, advised CT chest by Pulmonary Dr Trivedi, Neuro and psych consult called, will continue home meds and empirical abx, biomarkers for PNA ordered." CXR revealed "Questionable left base infiltrate. Follow-up study is recommended as clinically warranted. "

## 2019-06-28 NOTE — SWALLOW VFSS/MBS ASSESSMENT ADULT - COMMENTS
Patient with reports of increased back pain with transition into cine chair with maximum assistance compromising adequate positioning for evaluation. Patient with continued c/o back pain  refused exam at this time.   Discussed with Dr. Mendenhall. Will re-assess patient at bedside .

## 2019-06-28 NOTE — SWALLOW BEDSIDE ASSESSMENT ADULT - ASR SWALLOW RECOMMEND DIAG
VFSS/MBS/MBS as out-patient for objective assessment of pharyngeal swallow
VFSS/MBS/if concerned for PNA given recent chest imaging, though no overt s/s of penetration/aspiration noted during today's clinical assessment

## 2019-06-28 NOTE — SWALLOW VFSS/MBS ASSESSMENT ADULT - SLP PERTINENT HISTORY OF CURRENT PROBLEM
Patient with history of dysphagia, is familiar to this department from recent bedside swallow evaluation , see full report for details

## 2019-06-28 NOTE — SWALLOW BEDSIDE ASSESSMENT ADULT - ADDITIONAL RECOMMENDATIONS
This department to follow as schedule permits re: diet tolerance.
this dept to continue to f/u as schedule permits

## 2019-06-29 VITALS
RESPIRATION RATE: 18 BRPM | DIASTOLIC BLOOD PRESSURE: 70 MMHG | HEART RATE: 58 BPM | TEMPERATURE: 99 F | OXYGEN SATURATION: 94 % | SYSTOLIC BLOOD PRESSURE: 132 MMHG

## 2019-06-29 PROCEDURE — 71275 CT ANGIOGRAPHY CHEST: CPT

## 2019-06-29 PROCEDURE — 97530 THERAPEUTIC ACTIVITIES: CPT

## 2019-06-29 PROCEDURE — 81001 URINALYSIS AUTO W/SCOPE: CPT

## 2019-06-29 PROCEDURE — 99285 EMERGENCY DEPT VISIT HI MDM: CPT

## 2019-06-29 PROCEDURE — 82746 ASSAY OF FOLIC ACID SERUM: CPT

## 2019-06-29 PROCEDURE — 93005 ELECTROCARDIOGRAM TRACING: CPT

## 2019-06-29 PROCEDURE — 85730 THROMBOPLASTIN TIME PARTIAL: CPT

## 2019-06-29 PROCEDURE — 84145 PROCALCITONIN (PCT): CPT

## 2019-06-29 PROCEDURE — 80053 COMPREHEN METABOLIC PANEL: CPT

## 2019-06-29 PROCEDURE — 83605 ASSAY OF LACTIC ACID: CPT

## 2019-06-29 PROCEDURE — 97116 GAIT TRAINING THERAPY: CPT

## 2019-06-29 PROCEDURE — 87086 URINE CULTURE/COLONY COUNT: CPT

## 2019-06-29 PROCEDURE — 83735 ASSAY OF MAGNESIUM: CPT

## 2019-06-29 PROCEDURE — 82607 VITAMIN B-12: CPT

## 2019-06-29 PROCEDURE — 36415 COLL VENOUS BLD VENIPUNCTURE: CPT

## 2019-06-29 PROCEDURE — 87040 BLOOD CULTURE FOR BACTERIA: CPT

## 2019-06-29 PROCEDURE — 85027 COMPLETE CBC AUTOMATED: CPT

## 2019-06-29 PROCEDURE — 86780 TREPONEMA PALLIDUM: CPT

## 2019-06-29 PROCEDURE — 86140 C-REACTIVE PROTEIN: CPT

## 2019-06-29 PROCEDURE — 85610 PROTHROMBIN TIME: CPT

## 2019-06-29 PROCEDURE — 71045 X-RAY EXAM CHEST 1 VIEW: CPT

## 2019-06-29 PROCEDURE — 84443 ASSAY THYROID STIM HORMONE: CPT

## 2019-06-29 PROCEDURE — 87186 SC STD MICRODIL/AGAR DIL: CPT

## 2019-06-29 PROCEDURE — 97161 PT EVAL LOW COMPLEX 20 MIN: CPT

## 2019-06-29 PROCEDURE — 80048 BASIC METABOLIC PNL TOTAL CA: CPT

## 2019-06-29 RX ORDER — MIRTAZAPINE 45 MG/1
1 TABLET, ORALLY DISINTEGRATING ORAL
Qty: 30 | Refills: 0
Start: 2019-06-29 | End: 2019-07-28

## 2019-06-29 RX ORDER — DONEPEZIL HYDROCHLORIDE 10 MG/1
1 TABLET, FILM COATED ORAL
Qty: 30 | Refills: 0
Start: 2019-06-29 | End: 2019-07-28

## 2019-06-29 RX ORDER — MELOXICAM 15 MG/1
1 TABLET ORAL
Qty: 0 | Refills: 0 | DISCHARGE

## 2019-06-29 RX ORDER — APIXABAN 2.5 MG/1
1 TABLET, FILM COATED ORAL
Qty: 0 | Refills: 0 | DISCHARGE

## 2019-06-29 RX ORDER — APIXABAN 2.5 MG/1
1 TABLET, FILM COATED ORAL
Qty: 30 | Refills: 0
Start: 2019-06-29 | End: 2019-07-13

## 2019-06-29 RX ORDER — MIRTAZAPINE 45 MG/1
1 TABLET, ORALLY DISINTEGRATING ORAL
Qty: 0 | Refills: 0 | DISCHARGE

## 2019-06-29 RX ORDER — QUETIAPINE FUMARATE 200 MG/1
1 TABLET, FILM COATED ORAL
Qty: 90 | Refills: 0
Start: 2019-06-29 | End: 2019-07-28

## 2019-06-29 RX ORDER — CARVEDILOL PHOSPHATE 80 MG/1
1 CAPSULE, EXTENDED RELEASE ORAL
Qty: 60 | Refills: 0
Start: 2019-06-29 | End: 2019-07-28

## 2019-06-29 RX ORDER — DONEPEZIL HYDROCHLORIDE 10 MG/1
1 TABLET, FILM COATED ORAL
Qty: 0 | Refills: 0 | DISCHARGE

## 2019-06-29 RX ORDER — QUETIAPINE FUMARATE 200 MG/1
1 TABLET, FILM COATED ORAL
Qty: 0 | Refills: 0 | DISCHARGE

## 2019-06-29 RX ADMIN — QUETIAPINE FUMARATE 100 MILLIGRAM(S): 200 TABLET, FILM COATED ORAL at 13:37

## 2019-06-29 RX ADMIN — APIXABAN 5 MILLIGRAM(S): 2.5 TABLET, FILM COATED ORAL at 05:46

## 2019-06-29 RX ADMIN — Medication 1 DROP(S): at 12:09

## 2019-06-29 RX ADMIN — CARVEDILOL PHOSPHATE 12.5 MILLIGRAM(S): 80 CAPSULE, EXTENDED RELEASE ORAL at 05:46

## 2019-06-29 RX ADMIN — QUETIAPINE FUMARATE 100 MILLIGRAM(S): 200 TABLET, FILM COATED ORAL at 05:45

## 2019-06-29 RX ADMIN — Medication 10 MILLIGRAM(S): at 05:46

## 2019-06-29 NOTE — PROGRESS NOTE ADULT - ATTENDING COMMENTS
60minutes spent on this visit, 50% visit time spent in care co-ordination with other attendings and counselling patient  I have discussed care plan with patient and HCP,expressed understanding of problems treatment and their effect and side effects, alternatives in detail,I have asked if they have any questions and concerns and appropriately addressed them to best of my ability  Reviewed all diagonostic tests, lab results and drug drug interactions, and medications
60minutes spent on this visit, 50% visit time spent in care co-ordination with other attendings and counselling patient  I have discussed care plan with patient and HCP,expressed understanding of problems treatment and their effect and side effects, alternatives in detail,I have asked if they have any questions and concerns and appropriately addressed them to best of my ability  Reviewed all diagonostic tests, lab results and drug drug interactions, and medications
45 minutes spent on this visit, 50% visit time spent in care co-ordination with other attendings and counselling patient  I have discussed care plan with patient and HCP,expressed understanding of problems treatment and their effect and side effects, alternatives in detail,I have asked if they have any questions and concerns and appropriately addressed them to best of my ability  Reviewed all diagonostic tests, lab results and drug drug interactions, and medications
45 minutes spent on this visit, 50% visit time spent in care co-ordination with other attendings and counselling patient  I have discussed care plan with patient and HCP,expressed understanding of problems treatment and their effect and side effects, alternatives in detail,I have asked if they have any questions and concerns and appropriately addressed them to best of my ability  Reviewed all diagonostic tests, lab results and drug drug interactions, and medications

## 2019-06-29 NOTE — PROGRESS NOTE ADULT - PROBLEM SELECTOR PLAN 2
rocephine zithro ct chest on admission PNA PE ruled ou DC abx

## 2019-06-29 NOTE — PROGRESS NOTE ADULT - PROBLEM SELECTOR PLAN 1
dementia  poor dentition  HTN  no evidence of PNA on ct chest or Biomarkers - will dc ABX  tolerating room air  oral hygiene  skin care  pt is DNR - MOLST - GOC - spoke with HCP  will follow and monitor  assist with ADL
likely worsening dementia, r/o infection cont  psych meds
psych follow up noted  on room air  GOC documented  pt is DNR DNI  no evidence of PNA  CT chest reviewed  am labs pending  oral hygiene  skin care  assist with ADL as needed  dc planning  out of bed - fall prec - nutrition -   will follow  overall better -   cx report - noted -
seen and examined, vs and meds reviewed, labs reviewed, am labs pending  psych follow up noted  no evidence of PNA  dc planning under way  on Eliis for AF  cardio follow up  assist with ADL as needed  fall prec  nutrition  pt is DNR DNI
likely worsening dementia, r/o infection cont  psych meds
seen and examined  vs and meds reviewed  labs reviewed  CM noted reviewed  planned for [facility] return  no PNA  imaging and labs reviewed  pt is DNR DNI  fall prec  assist with ADL as needed  oral hygiene  dental hygiene  will follow
likely worsening dementia, r/o infection cont  psych meds, seroquel increased
likely worsening dementia, r/o infection cont  psych meds

## 2019-06-29 NOTE — PROGRESS NOTE ADULT - SUBJECTIVE AND OBJECTIVE BOX
Date/Time Patient Seen:  		  Referring MD:   Data Reviewed	       Patient is a 73y old  Male who presents with a chief complaint of AMS (25 Jun 2019 13:34)      Subjective/HPI     PAST MEDICAL & SURGICAL HISTORY:  Atrial fibrillation  Cerebrovascular accident (CVA)  Depression  Dementia  Anxiety  Hypertension  Cataract  AICD (automatic cardioverter/defibrillator) present: [facility]        Medication list         MEDICATIONS  (STANDING):  apixaban 5 milliGRAM(s) Oral every 12 hours  artificial tears (preservative free) Ophthalmic Solution 1 Drop(s) Both EYES two times a day  carvedilol 12.5 milliGRAM(s) Oral every 12 hours  donepezil 10 milliGRAM(s) Oral at bedtime  enalapril 10 milliGRAM(s) Oral daily  ketorolac 0.5% Ophthalmic Solution 1 Drop(s) Left EYE daily  mirtazapine 15 milliGRAM(s) Oral at bedtime  QUEtiapine 100 milliGRAM(s) Oral two times a day    MEDICATIONS  (PRN):  guaiFENesin   Syrup  (Sugar-Free) 200 milliGRAM(s) Oral every 6 hours PRN Cough         Vitals log        ICU Vital Signs Last 24 Hrs  T(C): 36.9 (26 Jun 2019 05:02), Max: 37.2 (25 Jun 2019 17:04)  T(F): 98.5 (26 Jun 2019 05:02), Max: 98.9 (25 Jun 2019 17:04)  HR: 68 (26 Jun 2019 05:02) (61 - 78)  BP: 102/67 (26 Jun 2019 05:02) (91/58 - 165/83)  BP(mean): --  ABP: --  ABP(mean): --  RR: 16 (26 Jun 2019 05:02) (14 - 18)  SpO2: 96% (26 Jun 2019 05:02) (95% - 99%)           Input and Output:  I&O's Detail    25 Jun 2019 07:01  -  26 Jun 2019 06:10  --------------------------------------------------------  IN:    IV PiggyBack: 300 mL  Total IN: 300 mL    OUT:    Voided: 300 mL  Total OUT: 300 mL    Total NET: 0 mL          Lab Data                        16.2   8.77  )-----------( 216      ( 25 Jun 2019 09:37 )             47.4     06-25    130<L>  |  97  |  11  ----------------------------<  122<H>  4.5   |  32<H>  |  0.75    Ca    8.8      25 Jun 2019 09:37    TPro  7.1  /  Alb  3.4  /  TBili  0.7  /  DBili  x   /  AST  21  /  ALT  20  /  AlkPhos  91  06-25            Review of Systems	      Objective     Physical Examination    heart s1s2  lung dec BS  abd soft  on room air  poor dentition      Pertinent Lab findings & Imaging      Artemio:  NO   Adequate UO     I&O's Detail    25 Jun 2019 07:01  -  26 Jun 2019 06:10  --------------------------------------------------------  IN:    IV PiggyBack: 300 mL  Total IN: 300 mL    OUT:    Voided: 300 mL  Total OUT: 300 mL    Total NET: 0 mL               Discussed with:     Cultures:	        Radiology
Chief Complaint: Agitation    Interval Events: No events overnight.    Review of Systems:  General: No fevers, chills, weight loss or gain  Skin: No rashes, color changes  Cardiovascular: No chest pain, orthopnea  Respiratory: No shortness of breath, cough  Gastrointestinal: No nausea, abdominal pain  Genitourinary: No incontinence, pain with urination  Musculoskeletal: No pain, swelling, decreased range of motion  Neurological: No headache, weakness  Psychiatric: No depression, anxiety  Endocrine: No weight loss or gain, increased thirst  All other systems are comprehensively negative.    Physical Exam:  Vital Signs Last 24 Hrs  T(C): 36.4 (28 Jun 2019 04:10), Max: 36.8 (27 Jun 2019 17:25)  T(F): 97.5 (28 Jun 2019 04:10), Max: 98.3 (27 Jun 2019 17:25)  HR: 71 (28 Jun 2019 04:10) (60 - 71)  BP: 111/60 (28 Jun 2019 04:10) (95/62 - 148/75)  BP(mean): --  RR: 18 (28 Jun 2019 04:10) (17 - 18)  SpO2: 91% (28 Jun 2019 04:10) (91% - 96%)  General: NAD  HEENT: MMM  Neck: No JVD, no carotid bruit  Lungs: CTAB  CV: RRR, nl S1/S2, no M/R/G  Abdomen: S/NT/ND, +BS  Extremities: No LE edema, no cyanosis  Neuro: AAOx3, non-focal  Skin: No rash    Labs:
Chief Complaint: Agitation    Interval Events: No events overnight.    Review of Systems:  General: No fevers, chills, weight loss or gain  Skin: No rashes, color changes  Cardiovascular: No chest pain, orthopnea  Respiratory: No shortness of breath, cough  Gastrointestinal: No nausea, abdominal pain  Genitourinary: No incontinence, pain with urination  Musculoskeletal: No pain, swelling, decreased range of motion  Neurological: No headache, weakness  Psychiatric: No depression, anxiety  Endocrine: No weight loss or gain, increased thirst  All other systems are comprehensively negative.    Physical Exam:  Vital Signs Last 24 Hrs  T(C): 36.8 (27 Jun 2019 05:30), Max: 36.8 (26 Jun 2019 21:55)  T(F): 98.3 (27 Jun 2019 05:30), Max: 98.3 (27 Jun 2019 05:30)  HR: 64 (27 Jun 2019 05:30) (61 - 65)  BP: 132/77 (27 Jun 2019 05:30) (113/75 - 141/75)  BP(mean): --  RR: 18 (27 Jun 2019 05:30) (18 - 18)  SpO2: 98% (27 Jun 2019 05:30) (93% - 98%)  General: NAD  HEENT: MMM  Neck: No JVD, no carotid bruit  Lungs: CTAB  CV: RRR, nl S1/S2, no M/R/G  Abdomen: S/NT/ND, +BS  Extremities: No LE edema, no cyanosis  Neuro: AAOx3, non-focal  Skin: No rash    Labs:             06-26    136  |  100  |  14  ----------------------------<  101<H>  3.8   |  31  |  0.92    Ca    9.0      26 Jun 2019 07:59  Mg     1.6     06-26    TPro  7.1  /  Alb  3.4  /  TBili  0.7  /  DBili  x   /  AST  21  /  ALT  20  /  AlkPhos  91  06-25                          16.8   8.78  )-----------( 211      ( 26 Jun 2019 07:59 )             49.4
Chief Complaint: Agitation    Interval Events: No events overnight. No complaints.    Review of Systems:  General: No fevers, chills, weight loss or gain  Skin: No rashes, color changes  Cardiovascular: No chest pain, orthopnea  Respiratory: No shortness of breath, cough  Gastrointestinal: No nausea, abdominal pain  Genitourinary: No incontinence, pain with urination  Musculoskeletal: No pain, swelling, decreased range of motion  Neurological: No headache, weakness  Psychiatric: No depression, anxiety  Endocrine: No weight loss or gain, increased thirst  All other systems are comprehensively negative.    Physical Exam:  Vitals:        Vital Signs Last 24 Hrs  T(C): 36.8 (26 Jun 2019 07:56), Max: 37.2 (25 Jun 2019 17:04)  T(F): 98.3 (26 Jun 2019 07:56), Max: 98.9 (25 Jun 2019 17:04)  HR: 68 (26 Jun 2019 07:56) (61 - 78)  BP: 115/68 (26 Jun 2019 07:56) (91/58 - 165/83)  BP(mean): --  RR: 18 (26 Jun 2019 07:56) (16 - 18)  SpO2: 99% (26 Jun 2019 07:56) (95% - 99%)  General: NAD  HEENT: MMM  Neck: No JVD, no carotid bruit  Lungs: CTAB  CV: RRR, nl S1/S2, no M/R/G  Abdomen: S/NT/ND, +BS  Extremities: No LE edema, no cyanosis  Neuro: AAOx3, non-focal  Skin: No rash    Labs:                        16.8   8.78  )-----------( 211      ( 26 Jun 2019 07:59 )             49.4     06-26    136  |  100  |  14  ----------------------------<  101<H>  3.8   |  31  |  0.92    Ca    9.0      26 Jun 2019 07:59  Mg     1.6     06-26    TPro  7.1  /  Alb  3.4  /  TBili  0.7  /  DBili  x   /  AST  21  /  ALT  20  /  AlkPhos  91  06-25        PT/INR - ( 25 Jun 2019 09:37 )   PT: 12.7 sec;   INR: 1.16 ratio         PTT - ( 25 Jun 2019 09:37 )  PTT:35.2 sec
Date/Time Patient Seen:  		  Referring MD:   Data Reviewed	       Patient is a 73y old  Male who presents with a chief complaint of AMS (26 Jun 2019 13:57)      Subjective/HPI     PAST MEDICAL & SURGICAL HISTORY:  Atrial fibrillation  Cerebrovascular accident (CVA)  Depression  Dementia  Anxiety  Hypertension  Cataract  AICD (automatic cardioverter/defibrillator) present: [facility]        Medication list         MEDICATIONS  (STANDING):  apixaban 5 milliGRAM(s) Oral every 12 hours  artificial tears (preservative free) Ophthalmic Solution 1 Drop(s) Both EYES two times a day  carvedilol 12.5 milliGRAM(s) Oral every 12 hours  donepezil 10 milliGRAM(s) Oral daily  enalapril 10 milliGRAM(s) Oral daily  ketorolac 0.5% Ophthalmic Solution 1 Drop(s) Left EYE daily  mirtazapine 15 milliGRAM(s) Oral at bedtime  QUEtiapine 100 milliGRAM(s) Oral three times a day    MEDICATIONS  (PRN):  guaiFENesin   Syrup  (Sugar-Free) 200 milliGRAM(s) Oral every 6 hours PRN Cough         Vitals log        ICU Vital Signs Last 24 Hrs  T(C): 36.7 (27 Jun 2019 00:37), Max: 36.8 (26 Jun 2019 07:56)  T(F): 98 (27 Jun 2019 00:37), Max: 98.3 (26 Jun 2019 07:56)  HR: 65 (27 Jun 2019 00:37) (61 - 68)  BP: 141/75 (27 Jun 2019 00:37) (113/75 - 141/75)  BP(mean): --  ABP: --  ABP(mean): --  RR: 18 (27 Jun 2019 00:37) (18 - 18)  SpO2: 93% (27 Jun 2019 00:37) (93% - 99%)           Input and Output:  I&O's Detail    25 Jun 2019 07:01  -  26 Jun 2019 07:00  --------------------------------------------------------  IN:    IV PiggyBack: 300 mL  Total IN: 300 mL    OUT:    Voided: 300 mL  Total OUT: 300 mL    Total NET: 0 mL      26 Jun 2019 07:01  -  27 Jun 2019 05:49  --------------------------------------------------------  IN:  Total IN: 0 mL    OUT:    Voided: 800 mL  Total OUT: 800 mL    Total NET: -800 mL          Lab Data                        16.8   8.78  )-----------( 211      ( 26 Jun 2019 07:59 )             49.4     06-26    136  |  100  |  14  ----------------------------<  101<H>  3.8   |  31  |  0.92    Ca    9.0      26 Jun 2019 07:59  Mg     1.6     06-26    TPro  7.1  /  Alb  3.4  /  TBili  0.7  /  DBili  x   /  AST  21  /  ALT  20  /  AlkPhos  91  06-25            Review of Systems	      Objective     Physical Examination    heart s1s2  lung dec BS  abd soft  poor dentition      Pertinent Lab findings & Imaging      Artemio:  NO   Adequate UO     I&O's Detail    25 Jun 2019 07:01  -  26 Jun 2019 07:00  --------------------------------------------------------  IN:    IV PiggyBack: 300 mL  Total IN: 300 mL    OUT:    Voided: 300 mL  Total OUT: 300 mL    Total NET: 0 mL      26 Jun 2019 07:01  -  27 Jun 2019 05:49  --------------------------------------------------------  IN:  Total IN: 0 mL    OUT:    Voided: 800 mL  Total OUT: 800 mL    Total NET: -800 mL               Discussed with:     Cultures:	        Radiology
Date/Time Patient Seen:  		  Referring MD:   Data Reviewed	       Patient is a 73y old  Male who presents with a chief complaint of AMS (27 Jun 2019 10:59)      Subjective/HPI     PAST MEDICAL & SURGICAL HISTORY:  Atrial fibrillation  Cerebrovascular accident (CVA)  Depression  Dementia  Anxiety  Hypertension  Cataract  AICD (automatic cardioverter/defibrillator) present: [facility]        Medication list         MEDICATIONS  (STANDING):  apixaban 5 milliGRAM(s) Oral every 12 hours  artificial tears (preservative free) Ophthalmic Solution 1 Drop(s) Both EYES two times a day  carvedilol 12.5 milliGRAM(s) Oral every 12 hours  donepezil 10 milliGRAM(s) Oral daily  enalapril 10 milliGRAM(s) Oral daily  ketorolac 0.5% Ophthalmic Solution 1 Drop(s) Left EYE daily  mirtazapine 15 milliGRAM(s) Oral at bedtime  QUEtiapine 100 milliGRAM(s) Oral three times a day    MEDICATIONS  (PRN):  guaiFENesin   Syrup  (Sugar-Free) 200 milliGRAM(s) Oral every 6 hours PRN Cough         Vitals log        ICU Vital Signs Last 24 Hrs  T(C): 36.4 (28 Jun 2019 04:10), Max: 36.8 (27 Jun 2019 17:25)  T(F): 97.5 (28 Jun 2019 04:10), Max: 98.3 (27 Jun 2019 17:25)  HR: 71 (28 Jun 2019 04:10) (60 - 71)  BP: 111/60 (28 Jun 2019 04:10) (95/62 - 148/75)  BP(mean): --  ABP: --  ABP(mean): --  RR: 18 (28 Jun 2019 04:10) (17 - 18)  SpO2: 91% (28 Jun 2019 04:10) (91% - 98%)           Input and Output:  I&O's Detail    26 Jun 2019 07:01  -  27 Jun 2019 07:00  --------------------------------------------------------  IN:  Total IN: 0 mL    OUT:    Voided: 800 mL  Total OUT: 800 mL    Total NET: -800 mL          Lab Data                        16.8   8.78  )-----------( 211      ( 26 Jun 2019 07:59 )             49.4     06-26    136  |  100  |  14  ----------------------------<  101<H>  3.8   |  31  |  0.92    Ca    9.0      26 Jun 2019 07:59  Mg     1.6     06-26              Review of Systems	      Objective     Physical Examination    heart s1s2  lung dec BS  abd soft      Pertinent Lab findings & Imaging      Artemio:  NO   Adequate UO     I&O's Detail    26 Jun 2019 07:01  -  27 Jun 2019 07:00  --------------------------------------------------------  IN:  Total IN: 0 mL    OUT:    Voided: 800 mL  Total OUT: 800 mL    Total NET: -800 mL               Discussed with:     Cultures:	        Radiology
Neurology follow up note    CECILLE [id number]      Interval History:    Patient feels ok no new complaints.    MEDICATIONS    apixaban 5 milliGRAM(s) Oral every 12 hours  artificial tears (preservative free) Ophthalmic Solution 1 Drop(s) Both EYES two times a day  carvedilol 12.5 milliGRAM(s) Oral every 12 hours  donepezil 10 milliGRAM(s) Oral daily  enalapril 10 milliGRAM(s) Oral daily  guaiFENesin   Syrup  (Sugar-Free) 200 milliGRAM(s) Oral every 6 hours PRN  ketorolac 0.5% Ophthalmic Solution 1 Drop(s) Left EYE daily  mirtazapine 15 milliGRAM(s) Oral at bedtime  QUEtiapine 100 milliGRAM(s) Oral three times a day      Allergies    No Known Allergies    Intolerances            Vital Signs Last 24 Hrs  T(C): 36.6 (29 Jun 2019 05:26), Max: 37.1 (28 Jun 2019 17:19)  T(F): 97.9 (29 Jun 2019 05:26), Max: 98.8 (28 Jun 2019 17:19)  HR: 60 (29 Jun 2019 05:26) (54 - 75)  BP: 127/73 (29 Jun 2019 05:26) (100/64 - 160/84)  BP(mean): --  RR: 18 (29 Jun 2019 05:26) (18 - 20)  SpO2: 95% (29 Jun 2019 05:26) (94% - 95%)    REVIEW OF SYSTEMS: Limited or unable to obtain secondary to patient's poor mental status.    Constitutional: No fever, chills, fatigue, weakness  Eyes: no eye pain, visual disturbances, or discharge  ENT:  No difficulty hearing, tinnitus, vertigo; No sinus or throat pain  Neck: No pain or stiffness  Respiratory: No cough, dyspnea, wheezing   Cardiovascular: No chest pain, palpitations,   Gastrointestinal: No abdominal or epigastric pain. No nausea, vomiting  No diarrhea or constipation.   Genitourinary: No dysuria, frequency, hematuria or incontinence  Neurological: No headaches, lightheadedness, vertigo, numbness or tremors  Psychiatric: No depression, anxiety, mood swings or difficulty sleeping  Musculoskeletal: No joint pain or swelling; No muscle, back or extremity pain  Skin: No itching, burning, rashes or lesions   Lymph Nodes: No enlarged glands  Endocrine: No heat or cold intolerance; No hair loss   Allergy and Immunologic: No hives or eczema    On Neurological Examination:    The patient is awake and alert.    He was able to name simple objects.  Recall was 0/3 within three minutes.     Extraocular movements were intact.    Pupils were equal, round, and reactive bilaterally 3 mm to 2 mm.    Speech was fluent.    Smile was symmetric.    Motor:  Bilateral upper were 5/5.  The patient's left hand, the patient's middle finger, and ring finger were in a flexed position.  As per the patient stated, he was bitten by a deer tick, ever since then it had been like that and he had decreased light touch in those two fingers.    Bilateral lower extremities were 4-/5.    Sensory:  Decreased light touch in his left hand in the middle and the ring finger.    GENERAL Exam: Nontoxic , No Acute Distress   	  HEENT:  normocephalic, atraumatic  		  LUNGS: Decreased bilaterally  	  HEART: Normal S1S2   No murmur RRR        	  GI/ ABDOMEN:  Soft  Non tender    EXTREMITIES:   No Edema  No Clubbing  No Cyanosis     MUSCULOSKELETAL: decreased Range of Motion all 4 extremities   	   SKIN: Normal  No Ecchymosis               LABS:  CBC Full  -  ( 28 Jun 2019 08:35 )  WBC Count : 8.91 K/uL  RBC Count : 5.31 M/uL  Hemoglobin : 16.6 g/dL  Hematocrit : 48.3 %  Platelet Count - Automated : 202 K/uL  Mean Cell Volume : 91.0 fl  Mean Cell Hemoglobin : 31.3 pg  Mean Cell Hemoglobin Concentration : 34.4 gm/dL  Auto Neutrophil # : x  Auto Lymphocyte # : x  Auto Monocyte # : x  Auto Eosinophil # : x  Auto Basophil # : x  Auto Neutrophil % : x  Auto Lymphocyte % : x  Auto Monocyte % : x  Auto Eosinophil % : x  Auto Basophil % : x      06-28    136  |  102  |  16  ----------------------------<  103<H>  4.2   |  24  |  0.80    Ca    9.2      28 Jun 2019 08:35      Hemoglobin A1C:       Vitamin B12         RADIOLOGY      ANALYSIS AND PLAN:  This is a 73-year-old with episodes of change in mental status, history of atrial fibrillation, and dementia.  1.	For episode of change in mental status, suspect this could be possibly secondary to progressive dementia becoming worse in the hospital setting. Questionable UTI.  psych noted I will change the patient's Seroquel from 100 mg  three times a day and we will adjust accordingly.  2.	For history of atrial fibrillation, continue the patient on Eliquis.  3.	For history of cerebrovascular accident possibly, we will continue the patient on anticoagulation.  4.	For history of dementia, continue the patient on Aricept.  5.	Monitor respiratory status as needed  6.	monitor oral intake as needed   7.	  Thank you for the courtesy of this consultation.    Physical therapy evaluation as tolerated  OOB to chair/ambulation with assistance only if possible.    Greater than 40 minutes spent in direct patient care reviewing  the notes, lab data/ imaging , discussion with multidisciplinary team.
Neurology follow up note    CECILLE [id number]      Interval History:    Patient feels ok no new complaints.    MEDICATIONS    apixaban 5 milliGRAM(s) Oral every 12 hours  artificial tears (preservative free) Ophthalmic Solution 1 Drop(s) Both EYES two times a day  carvedilol 12.5 milliGRAM(s) Oral every 12 hours  donepezil 10 milliGRAM(s) Oral daily  enalapril 10 milliGRAM(s) Oral daily  guaiFENesin   Syrup  (Sugar-Free) 200 milliGRAM(s) Oral every 6 hours PRN  ketorolac 0.5% Ophthalmic Solution 1 Drop(s) Left EYE daily  mirtazapine 15 milliGRAM(s) Oral at bedtime  QUEtiapine 100 milliGRAM(s) Oral three times a day      Allergies    No Known Allergies    Intolerances            Vital Signs Last 24 Hrs  T(C): 36.8 (2019 05:30), Max: 36.8 (2019 21:55)  T(F): 98.3 (2019 05:30), Max: 98.3 (2019 05:30)  HR: 64 (2019 05:30) (61 - 65)  BP: 132/77 (2019 05:30) (113/75 - 141/75)  BP(mean): --  RR: 18 (2019 05:30) (18 - 18)  SpO2: 98% (2019 05:30) (93% - 98%)      REVIEW OF SYSTEMS: Limited or unable to obtain secondary to patient's poor mental status.    Constitutional: No fever, chills, fatigue, weakness  Eyes: no eye pain, visual disturbances, or discharge  ENT:  No difficulty hearing, tinnitus, vertigo; No sinus or throat pain  Neck: No pain or stiffness  Respiratory: No cough, dyspnea, wheezing   Cardiovascular: No chest pain, palpitations,   Gastrointestinal: No abdominal or epigastric pain. No nausea, vomiting  No diarrhea or constipation.   Genitourinary: No dysuria, frequency, hematuria or incontinence  Neurological: No headaches, lightheadedness, vertigo, numbness or tremors  Psychiatric: No depression, anxiety, mood swings or difficulty sleeping  Musculoskeletal: No joint pain or swelling; No muscle, back or extremity pain  Skin: No itching, burning, rashes or lesions   Lymph Nodes: No enlarged glands  Endocrine: No heat or cold intolerance; No hair loss   Allergy and Immunologic: No hives or eczema    On Neurological Examination:    The patient is awake and alert.    He was able to name simple objects.  Recall was 0/3 within three minutes.     Extraocular movements were intact.    Pupils were equal, round, and reactive bilaterally 3 mm to 2 mm.    Speech was fluent.    Smile was symmetric.    Motor:  Bilateral upper were 5/5.  The patient's left hand, the patient's middle finger, and ring finger were in a flexed position.  As per the patient stated, he was bitten by a deer tick, ever since then it had been like that and he had decreased light touch in those two fingers.    Bilateral lower extremities were 4-/5.    Sensory:  Decreased light touch in his left hand in the middle and the ring finger.    GENERAL Exam: Nontoxic , No Acute Distress   	  HEENT:  normocephalic, atraumatic  		  LUNGS: Decreased bilaterally  	  HEART: Normal S1S2   No murmur RRR        	  GI/ ABDOMEN:  Soft  Non tender    EXTREMITIES:   No Edema  No Clubbing  No Cyanosis     MUSCULOSKELETAL: decreased Range of Motion all 4 extremities   	   SKIN: Normal  No Ecchymosis               LABS:  CBC Full  -  ( 2019 07:59 )  WBC Count : 8.78 K/uL  RBC Count : 5.49 M/uL  Hemoglobin : 16.8 g/dL  Hematocrit : 49.4 %  Platelet Count - Automated : 211 K/uL  Mean Cell Volume : 90.0 fl  Mean Cell Hemoglobin : 30.6 pg  Mean Cell Hemoglobin Concentration : 34.0 gm/dL  Auto Neutrophil # : x  Auto Lymphocyte # : x  Auto Monocyte # : x  Auto Eosinophil # : x  Auto Basophil # : x  Auto Neutrophil % : x  Auto Lymphocyte % : x  Auto Monocyte % : x  Auto Eosinophil % : x  Auto Basophil % : x    Urinalysis Basic - ( 2019 09:34 )    Color: Yellow / Appearance: Clear / S.010 / pH: x  Gluc: x / Ketone: Negative  / Bili: Negative / Urobili: Negative mg/dL   Blood: x / Protein: Negative mg/dL / Nitrite: Positive   Leuk Esterase: Moderate / RBC: 0-2 /HPF / WBC 6-10   Sq Epi: x / Non Sq Epi: Few / Bacteria: Moderate      06-26    136  |  100  |  14  ----------------------------<  101<H>  3.8   |  31  |  0.92    Ca    9.0      2019 07:59  Mg     1.6     -    TPro  7.1  /  Alb  3.4  /  TBili  0.7  /  DBili  x   /  AST  21  /  ALT  20  /  AlkPhos  91      Hemoglobin A1C:     LIVER FUNCTIONS - ( 2019 09:37 )  Alb: 3.4 g/dL / Pro: 7.1 g/dL / ALK PHOS: 91 U/L / ALT: 20 U/L DA / AST: 21 U/L / GGT: x           Vitamin B12   PT/INR - ( 2019 09:37 )   PT: 12.7 sec;   INR: 1.16 ratio         PTT - ( 2019 09:37 )  PTT:35.2 sec      RADIOLOGY    ANALYSIS AND PLAN:  This is a 73-year-old with episodes of change in mental status, history of atrial fibrillation, and dementia.  1.	For episode of change in mental status, suspect this could be possibly secondary to progressive dementia becoming worse in the hospital setting. Questionable UTI.  I will change the patient's Seroquel from 100 mg twice a day to 75 mg three times a day and we will adjust accordingly.  2.	For history of atrial fibrillation, continue the patient on Eliquis.  3.	For history of cerebrovascular accident possibly, we will continue the patient on anticoagulation.  4.	For history of dementia, continue the patient on Aricept.  5.	Physical Therapy evaluation.  6.	Monitor respiratory status.  7.	I attempted to contact the family, Norberto, at [phone], alternative number [phone], no response. 19  Thank you for the courtesy of this consultation.    Physical therapy evaluation as tolerated  OOB to chair/ambulation with assistance only if possible.    Greater than 45 minutes spent in direct patient care reviewing  the notes, lab data/ imaging , discussion with multidisciplinary team.
Neurology follow up note    CECILLE [id number]      Interval History:    Patient feels ok     MEDICATIONS    apixaban 5 milliGRAM(s) Oral every 12 hours  artificial tears (preservative free) Ophthalmic Solution 1 Drop(s) Both EYES two times a day  carvedilol 12.5 milliGRAM(s) Oral every 12 hours  donepezil 10 milliGRAM(s) Oral daily  enalapril 10 milliGRAM(s) Oral daily  guaiFENesin   Syrup  (Sugar-Free) 200 milliGRAM(s) Oral every 6 hours PRN  ketorolac 0.5% Ophthalmic Solution 1 Drop(s) Left EYE daily  mirtazapine 15 milliGRAM(s) Oral at bedtime  QUEtiapine 100 milliGRAM(s) Oral three times a day      Allergies    No Known Allergies    Intolerances            Vital Signs Last 24 Hrs  T(C): 36.4 (28 Jun 2019 04:10), Max: 36.8 (27 Jun 2019 17:25)  T(F): 97.5 (28 Jun 2019 04:10), Max: 98.3 (27 Jun 2019 17:25)  HR: 71 (28 Jun 2019 04:10) (60 - 71)  BP: 111/60 (28 Jun 2019 04:10) (95/62 - 148/75)  BP(mean): --  RR: 18 (28 Jun 2019 04:10) (17 - 18)  SpO2: 91% (28 Jun 2019 04:10) (91% - 96%)    REVIEW OF SYSTEMS: Limited or unable to obtain secondary to patient's poor mental status.    Constitutional: No fever, chills, fatigue, weakness  Eyes: no eye pain, visual disturbances, or discharge  ENT:  No difficulty hearing, tinnitus, vertigo; No sinus or throat pain  Neck: No pain or stiffness  Respiratory: No cough, dyspnea, wheezing   Cardiovascular: No chest pain, palpitations,   Gastrointestinal: No abdominal or epigastric pain. No nausea, vomiting  No diarrhea or constipation.   Genitourinary: No dysuria, frequency, hematuria or incontinence  Neurological: No headaches, lightheadedness, vertigo, numbness or tremors  Psychiatric: No depression, anxiety, mood swings or difficulty sleeping  Musculoskeletal: No joint pain or swelling; No muscle, back or extremity pain  Skin: No itching, burning, rashes or lesions   Lymph Nodes: No enlarged glands  Endocrine: No heat or cold intolerance; No hair loss   Allergy and Immunologic: No hives or eczema    On Neurological Examination:    The patient is awake and alert.    He was able to name simple objects.  Recall was 0/3 within three minutes.     Extraocular movements were intact.    Pupils were equal, round, and reactive bilaterally 3 mm to 2 mm.    Speech was fluent.    Smile was symmetric.    Motor:  Bilateral upper were 5/5.  The patient's left hand, the patient's middle finger, and ring finger were in a flexed position.  As per the patient stated, he was bitten by a deer tick, ever since then it had been like that and he had decreased light touch in those two fingers.    Bilateral lower extremities were 4-/5.    Sensory:  Decreased light touch in his left hand in the middle and the ring finger.    GENERAL Exam: Nontoxic , No Acute Distress   	  HEENT:  normocephalic, atraumatic  		  LUNGS: Decreased bilaterally  	  HEART: Normal S1S2   No murmur RRR        	  GI/ ABDOMEN:  Soft  Non tender    EXTREMITIES:   No Edema  No Clubbing  No Cyanosis     MUSCULOSKELETAL: decreased Range of Motion all 4 extremities   	   SKIN: Normal  No Ecchymosis               LABS:  CBC Full  -  ( 28 Jun 2019 08:35 )  WBC Count : 8.91 K/uL  RBC Count : 5.31 M/uL  Hemoglobin : 16.6 g/dL  Hematocrit : 48.3 %  Platelet Count - Automated : 202 K/uL  Mean Cell Volume : 91.0 fl  Mean Cell Hemoglobin : 31.3 pg  Mean Cell Hemoglobin Concentration : 34.4 gm/dL  Auto Neutrophil # : x  Auto Lymphocyte # : x  Auto Monocyte # : x  Auto Eosinophil # : x  Auto Basophil # : x  Auto Neutrophil % : x  Auto Lymphocyte % : x  Auto Monocyte % : x  Auto Eosinophil % : x  Auto Basophil % : x            Hemoglobin A1C:       Vitamin B12 Vitamin B12, Serum: 546 pg/mL (06-27 @ 17:10)          RADIOLOGY    ANALYSIS AND PLAN:  This is a 73-year-old with episodes of change in mental status, history of atrial fibrillation, and dementia.  1.	For episode of change in mental status, suspect this could be possibly secondary to progressive dementia becoming worse in the hospital setting. Questionable UTI.  psych noted I will change the patient's Seroquel from 100 mg  three times a day and we will adjust accordingly.  2.	For history of atrial fibrillation, continue the patient on Eliquis.  3.	For history of cerebrovascular accident possibly, we will continue the patient on anticoagulation.  4.	For history of dementia, continue the patient on Aricept.  5.	Physical Therapy evaluation.  6.	Monitor respiratory status.  7.	  Thank you for the courtesy of this consultation.    Physical therapy evaluation as tolerated  OOB to chair/ambulation with assistance only if possible.    Greater than 45 minutes spent in direct patient care reviewing  the notes, lab data/ imaging , discussion with multidisciplinary team.
Patient is a 73y old  Male who presents with a chief complaint of AMS (28 Jun 2019 06:22)      INTERVAL HPI/OVERNIGHT EVENTS:no ac event overnight, pt intermittently agitated    Home Medications:  Artificial Tears Plus ophthalmic solution: 1 drop(s) to each affected eye 2 times a day (25 Jun 2019 10:22)  Coreg 12.5 mg oral tablet: 1 tab(s) orally 2 times a day (25 Jun 2019 09:11)  donepezil 10 mg oral tablet: 1 tab(s) orally once a day (at bedtime) (25 Jun 2019 09:11)  Eliquis 2.5 mg oral tablet: 1 tab(s) orally 2 times a day (25 Jun 2019 09:11)  enalapril 10 mg oral tablet: 1 tab(s) orally once a day (25 Jun 2019 09:11)  hydroCHLOROthiazide 12.5 mg oral tablet: 1 tab(s) orally once a day (25 Jun 2019 10:22)  ketorolac ophthalmic: 0.5  to each affected eye (25 Jun 2019 10:22)  mirtazapine 15 mg oral tablet: 1 tab(s) orally once a day (at bedtime) (25 Jun 2019 09:11)  Mobic 7.5 mg oral tablet: 1 tab(s) orally once a day (25 Jun 2019 10:22)  QUEtiapine 100 mg oral tablet: 1 tab(s) orally 2 times a day (25 Jun 2019 10:22)  Vitamin D2: 2000 unit(s) orally once a day (25 Jun 2019 09:11)      MEDICATIONS  (STANDING):  apixaban 5 milliGRAM(s) Oral every 12 hours  artificial tears (preservative free) Ophthalmic Solution 1 Drop(s) Both EYES two times a day  carvedilol 12.5 milliGRAM(s) Oral every 12 hours  donepezil 10 milliGRAM(s) Oral daily  enalapril 10 milliGRAM(s) Oral daily  ketorolac 0.5% Ophthalmic Solution 1 Drop(s) Left EYE daily  mirtazapine 15 milliGRAM(s) Oral at bedtime  QUEtiapine 100 milliGRAM(s) Oral three times a day    MEDICATIONS  (PRN):  guaiFENesin   Syrup  (Sugar-Free) 200 milliGRAM(s) Oral every 6 hours PRN Cough      Allergies    No Known Allergies    Intolerances        REVIEW OF SYSTEMS:  unreliable confused  Vital Signs Last 24 Hrs  T(C): 36.4 (28 Jun 2019 04:10), Max: 36.8 (27 Jun 2019 17:25)  T(F): 97.5 (28 Jun 2019 04:10), Max: 98.3 (27 Jun 2019 17:25)  HR: 71 (28 Jun 2019 04:10) (60 - 71)  BP: 111/60 (28 Jun 2019 04:10) (95/62 - 148/75)  BP(mean): --  RR: 18 (28 Jun 2019 04:10) (17 - 18)  SpO2: 91% (28 Jun 2019 04:10) (91% - 96%)    PHYSICAL EXAM:  GENERAL:  well-groomed, well-developed  HEAD:  Atraumatic, Normocephalic  EYES: EOMI, PERRLA, conjunctiva and sclera clear  ENMT: Moist mucous membranes,   NECK: Supple, No JVD, Normal thyroid  NERVOUS SYSTEM:  Alert & Oriented X2, poor memory  concentration; Motor Strength 4/5 B/L upper and lower extremities; DTRs 2+ intact and symmetric  CHEST/LUNG: Clear to percussion bilaterally; No rales, rhonchi, wheezing, or rubs  HEART: Regular rate and rhythm; No murmurs, rubs, or gallops  ABDOMEN: Soft, Nontender, Nondistended; Bowel sounds present  EXTREMITIES:  2+ Peripheral Pulses, No clubbing, cyanosis, or edema  SKIN: No rashes or lesions    LABS:                        16.6   8.91  )-----------( 202      ( 28 Jun 2019 08:35 )             48.3               CAPILLARY BLOOD GLUCOSE            Culture - Urine (collected 06-25-19 @ 16:42)  Source: .Urine  Final Report (06-27-19 @ 16:33):    >100,000 CFU/ml Escherichia coli  Organism: Escherichia coli (06-27-19 @ 16:33)  Organism: Escherichia coli (06-27-19 @ 16:33)      -  Amikacin: S <=16      -  Ampicillin: R >16 These ampicillin results predict results for amoxicillin      -  Ampicillin/Sulbactam: S <=8/4 Enterobacter, Citrobacter, and Serratia may develop resistance during prolonged therapy (3-4 days)      -  Aztreonam: S <=4      -  Cefepime: S <=4      -  Cefoxitin: S <=8      -  Ceftriaxone: S <=1 Enterobacter, Citrobacter, and Serratia may develop resistance during prolonged therapy      -  Ciprofloxacin: S <=1      -  Ertapenem: S <=1      -  Gentamicin: S <=4      -  Imipenem: S <=1      -  Levofloxacin: S <=2      -  Meropenem: S <=1      -  Nitrofurantoin: S <=32 Should not be used to treat pyelonephritis      -  Piperacillin/Tazobactam: S <=16      -  Tigecycline: S <=2      -  Tobramycin: S <=4      -  Trimethoprim/Sulfamethoxazole: S <=2/38      Method Type: TARAH    Culture - Blood (collected 06-25-19 @ 16:13)  Source: .Blood  Preliminary Report (06-26-19 @ 17:01):    No growth to date.    Culture - Blood (collected 06-25-19 @ 16:13)  Source: .Blood  Preliminary Report (06-26-19 @ 17:01):    No growth to date.        I&O's Summary      RADIOLOGY & ADDITIONAL TESTS:    Imaging Personally Reviewed:  [x ] YES  [ ] NO    Consultant(s) Notes Reviewed:  [x ] YES  [ ] NO    Care Discussed with Consultants/Other Providers [x ] YES  [ ] NO
Date/Time Patient Seen:  		  Referring MD:   Data Reviewed	       Patient is a 73y old  Male who presents with a chief complaint of AMS (28 Jun 2019 09:51)      Subjective/HPI     PAST MEDICAL & SURGICAL HISTORY:  Atrial fibrillation  Cerebrovascular accident (CVA)  Depression  Dementia  Anxiety  Hypertension  Cataract  AICD (automatic cardioverter/defibrillator) present: [facility]        Medication list         MEDICATIONS  (STANDING):  apixaban 5 milliGRAM(s) Oral every 12 hours  artificial tears (preservative free) Ophthalmic Solution 1 Drop(s) Both EYES two times a day  carvedilol 12.5 milliGRAM(s) Oral every 12 hours  donepezil 10 milliGRAM(s) Oral daily  enalapril 10 milliGRAM(s) Oral daily  ketorolac 0.5% Ophthalmic Solution 1 Drop(s) Left EYE daily  mirtazapine 15 milliGRAM(s) Oral at bedtime  QUEtiapine 100 milliGRAM(s) Oral three times a day    MEDICATIONS  (PRN):  guaiFENesin   Syrup  (Sugar-Free) 200 milliGRAM(s) Oral every 6 hours PRN Cough         Vitals log        ICU Vital Signs Last 24 Hrs  T(C): 36.6 (29 Jun 2019 05:26), Max: 37.1 (28 Jun 2019 17:19)  T(F): 97.9 (29 Jun 2019 05:26), Max: 98.8 (28 Jun 2019 17:19)  HR: 60 (29 Jun 2019 05:26) (54 - 75)  BP: 127/73 (29 Jun 2019 05:26) (100/64 - 160/84)  BP(mean): --  ABP: --  ABP(mean): --  RR: 18 (29 Jun 2019 05:26) (18 - 20)  SpO2: 95% (29 Jun 2019 05:26) (94% - 95%)           Input and Output:  I&O's Detail    28 Jun 2019 07:01  -  29 Jun 2019 05:54  --------------------------------------------------------  IN:    Oral Fluid: 100 mL  Total IN: 100 mL    OUT:  Total OUT: 0 mL    Total NET: 100 mL          Lab Data                        16.6   8.91  )-----------( 202      ( 28 Jun 2019 08:35 )             48.3     06-28    136  |  102  |  16  ----------------------------<  103<H>  4.2   |  24  |  0.80    Ca    9.2      28 Jun 2019 08:35              Review of Systems	      Objective     Physical Examination    heart s1s2  lung dec BS      Pertinent Lab findings & Imaging      Artemio:  NO   Adequate UO     I&O's Detail    28 Jun 2019 07:01  -  29 Jun 2019 05:54  --------------------------------------------------------  IN:    Oral Fluid: 100 mL  Total IN: 100 mL    OUT:  Total OUT: 0 mL    Total NET: 100 mL               Discussed with:     Cultures:	        Radiology
Patient is a 73y old  Male who presents with a chief complaint of AMS (29 Jun 2019 08:30)      INTERVAL HPI/OVERNIGHT EVENTS:no ac event    Home Medications:  Artificial Tears Plus ophthalmic solution: 1 drop(s) to each affected eye 2 times a day (25 Jun 2019 10:22)  Coreg 12.5 mg oral tablet: 1 tab(s) orally 2 times a day (25 Jun 2019 09:11)  donepezil 10 mg oral tablet: 1 tab(s) orally once a day (at bedtime) (25 Jun 2019 09:11)  Eliquis 2.5 mg oral tablet: 1 tab(s) orally 2 times a day (25 Jun 2019 09:11)  enalapril 10 mg oral tablet: 1 tab(s) orally once a day (25 Jun 2019 09:11)  hydroCHLOROthiazide 12.5 mg oral tablet: 1 tab(s) orally once a day (25 Jun 2019 10:22)  ketorolac ophthalmic: 0.5  to each affected eye (25 Jun 2019 10:22)  mirtazapine 15 mg oral tablet: 1 tab(s) orally once a day (at bedtime) (25 Jun 2019 09:11)  Mobic 7.5 mg oral tablet: 1 tab(s) orally once a day (25 Jun 2019 10:22)  QUEtiapine 100 mg oral tablet: 1 tab(s) orally 2 times a day (25 Jun 2019 10:22)  Vitamin D2: 2000 unit(s) orally once a day (25 Jun 2019 09:11)      MEDICATIONS  (STANDING):  apixaban 5 milliGRAM(s) Oral every 12 hours  artificial tears (preservative free) Ophthalmic Solution 1 Drop(s) Both EYES two times a day  carvedilol 12.5 milliGRAM(s) Oral every 12 hours  donepezil 10 milliGRAM(s) Oral daily  enalapril 10 milliGRAM(s) Oral daily  ketorolac 0.5% Ophthalmic Solution 1 Drop(s) Left EYE daily  mirtazapine 15 milliGRAM(s) Oral at bedtime  QUEtiapine 100 milliGRAM(s) Oral three times a day    MEDICATIONS  (PRN):  guaiFENesin   Syrup  (Sugar-Free) 200 milliGRAM(s) Oral every 6 hours PRN Cough      Allergies    No Known Allergies    Intolerances        REVIEW OF SYSTEMS:  unable as confused but denies any pain or sob has no complaint wants to go home    Vital Signs Last 24 Hrs  T(C): 36.6 (29 Jun 2019 05:26), Max: 37.1 (28 Jun 2019 17:19)  T(F): 97.9 (29 Jun 2019 05:26), Max: 98.8 (28 Jun 2019 17:19)  HR: 60 (29 Jun 2019 05:26) (54 - 75)  BP: 127/73 (29 Jun 2019 05:26) (100/64 - 160/84)  BP(mean): --  RR: 18 (29 Jun 2019 05:26) (18 - 20)  SpO2: 95% (29 Jun 2019 05:26) (94% - 95%)    PHYSICAL EXAM:  GENERAL:  well-groomed, well-developed  HEAD:  Atraumatic, Normocephalic  EYES: EOMI, PERRLA, conjunctiva and sclera clear  ENMT: Moist mucous membranes,   NECK: Supple, No JVD, Normal thyroid  NERVOUS SYSTEM:  Alert & Oriented X2 confused calm at presrent  CHEST/LUNG: Clear to percussion bilaterally; No rales, rhonchi, wheezing, or rubs  HEART: Regular rate and rhythm; No murmurs, rubs, or gallops  ABDOMEN: Soft, Nontender, Nondistended; Bowel sounds present  EXTREMITIES:  2+ Peripheral Pulses, No clubbing, cyanosis, or edema  LYMPH: No lymphadenopathy noted  SKIN: No rashes or lesions    LABS:                        16.6   8.91  )-----------( 202      ( 28 Jun 2019 08:35 )             48.3     06-28    136  |  102  |  16  ----------------------------<  103<H>  4.2   |  24  |  0.80    Ca    9.2      28 Jun 2019 08:35          CAPILLARY BLOOD GLUCOSE            Culture - Urine (collected 06-25-19 @ 16:42)  Source: .Urine  Final Report (06-27-19 @ 16:33):    >100,000 CFU/ml Escherichia coli  Organism: Escherichia coli (06-27-19 @ 16:33)  Organism: Escherichia coli (06-27-19 @ 16:33)      -  Amikacin: S <=16      -  Ampicillin: R >16 These ampicillin results predict results for amoxicillin      -  Ampicillin/Sulbactam: S <=8/4 Enterobacter, Citrobacter, and Serratia may develop resistance during prolonged therapy (3-4 days)      -  Aztreonam: S <=4      -  Cefepime: S <=4      -  Cefoxitin: S <=8      -  Ceftriaxone: S <=1 Enterobacter, Citrobacter, and Serratia may develop resistance during prolonged therapy      -  Ciprofloxacin: S <=1      -  Ertapenem: S <=1      -  Gentamicin: S <=4      -  Imipenem: S <=1      -  Levofloxacin: S <=2      -  Meropenem: S <=1      -  Nitrofurantoin: S <=32 Should not be used to treat pyelonephritis      -  Piperacillin/Tazobactam: S <=16      -  Tigecycline: S <=2      -  Tobramycin: S <=4      -  Trimethoprim/Sulfamethoxazole: S <=2/38      Method Type: TARAH    Culture - Blood (collected 06-25-19 @ 16:13)  Source: .Blood  Preliminary Report (06-26-19 @ 17:01):    No growth to date.    Culture - Blood (collected 06-25-19 @ 16:13)  Source: .Blood  Preliminary Report (06-26-19 @ 17:01):    No growth to date.        I&O's Summary    28 Jun 2019 07:01  -  29 Jun 2019 07:00  --------------------------------------------------------  IN: 100 mL / OUT: 0 mL / NET: 100 mL        RADIOLOGY & ADDITIONAL TESTS:    Imaging Personally Reviewed:  [ x] YES  [ ] NO    Consultant(s) Notes Reviewed:  [x ] YES  [ ] NO    Care Discussed with Consultants/Other Providers [ x] YES  [ ] NO
Patient is a 73y old  Male who presents with a chief complaint of AMS (27 Jun 2019 09:08)      INTERVAL HPI/OVERNIGHT EVENTS:  no ac event overnight  Home Medications:  Artificial Tears Plus ophthalmic solution: 1 drop(s) to each affected eye 2 times a day (25 Jun 2019 10:22)  Coreg 12.5 mg oral tablet: 1 tab(s) orally 2 times a day (25 Jun 2019 09:11)  donepezil 10 mg oral tablet: 1 tab(s) orally once a day (at bedtime) (25 Jun 2019 09:11)  Eliquis 2.5 mg oral tablet: 1 tab(s) orally 2 times a day (25 Jun 2019 09:11)  enalapril 10 mg oral tablet: 1 tab(s) orally once a day (25 Jun 2019 09:11)  hydroCHLOROthiazide 12.5 mg oral tablet: 1 tab(s) orally once a day (25 Jun 2019 10:22)  ketorolac ophthalmic: 0.5  to each affected eye (25 Jun 2019 10:22)  mirtazapine 15 mg oral tablet: 1 tab(s) orally once a day (at bedtime) (25 Jun 2019 09:11)  Mobic 7.5 mg oral tablet: 1 tab(s) orally once a day (25 Jun 2019 10:22)  QUEtiapine 100 mg oral tablet: 1 tab(s) orally 2 times a day (25 Jun 2019 10:22)  Vitamin D2: 2000 unit(s) orally once a day (25 Jun 2019 09:11)      MEDICATIONS  (STANDING):  apixaban 5 milliGRAM(s) Oral every 12 hours  artificial tears (preservative free) Ophthalmic Solution 1 Drop(s) Both EYES two times a day  carvedilol 12.5 milliGRAM(s) Oral every 12 hours  donepezil 10 milliGRAM(s) Oral daily  enalapril 10 milliGRAM(s) Oral daily  ketorolac 0.5% Ophthalmic Solution 1 Drop(s) Left EYE daily  mirtazapine 15 milliGRAM(s) Oral at bedtime  QUEtiapine 100 milliGRAM(s) Oral three times a day    MEDICATIONS  (PRN):  guaiFENesin   Syrup  (Sugar-Free) 200 milliGRAM(s) Oral every 6 hours PRN Cough      Allergies    No Known Allergies    Intolerances        REVIEW OF SYSTEMS:  CONSTITUTIONAL: No fever, weight loss, has fatigue  EYES: No eye pain, visual disturbances, or discharge  ENMT:  No difficulty hearing, tinnitus, vertigo; No sinus or throat pain  NECK: No pain or stiffness  BREASTS: No pain, masses, or nipple discharge  RESPIRATORY: No cough, wheezing, chills or hemoptysis; No shortness of breath  CARDIOVASCULAR: No chest pain, palpitations, dizziness, or leg swelling  GASTROINTESTINAL: No abdominal or epigastric pain. No nausea, vomiting, or hematemesis; No diarrhea or constipation. No melena or hematochezia.  GENITOURINARY: No dysuria, frequency, hematuria, or incontinence  NEUROLOGICAL: No headaches, memory loss, loss of strength, numbness, or tremors  SKIN: No itching, burning, rashes, or lesions   LYMPH NODES: No enlarged glands  ENDOCRINE: No heat or cold intolerance; No hair loss  MUSCULOSKELETAL: No joint pain or swelling; No muscle, back, or extremity pain  PSYCHIATRIC: No depression, anxiety, mood swings, or difficulty sleeping  HEME/LYMPH: No easy bruising, or bleeding gums  ALLERGY AND IMMUNOLOGIC: No hives or eczema    Vital Signs Last 24 Hrs  T(C): 36.7 (27 Jun 2019 08:30), Max: 36.8 (26 Jun 2019 21:55)  T(F): 98 (27 Jun 2019 08:30), Max: 98.3 (27 Jun 2019 05:30)  HR: 62 (27 Jun 2019 08:30) (61 - 65)  BP: 131/77 (27 Jun 2019 08:30) (113/75 - 141/75)  BP(mean): --  RR: 18 (27 Jun 2019 08:30) (18 - 18)  SpO2: 98% (27 Jun 2019 08:30) (93% - 98%)    PHYSICAL EXAM:  GENERAL: NAD, well-groomed, well-developed  HEAD:  Atraumatic, Normocephalic  EYES: EOMI, PERRLA, conjunctiva and sclera clear  ENMT: Moist mucous membranes,   NECK: Supple, No JVD, Normal thyroid  NERVOUS SYSTEM:  Alert & Oriented X3, Good concentration; Motor Strength 4/5 B/L upper and lower extremities; DTRs 2+ intact and symmetric, confused but calm today  CHEST/LUNG: Clear to percussion bilaterally; No rales, rhonchi, wheezing, or rubs  HEART: Regular rate and rhythm; No murmurs, rubs, or gallops  ABDOMEN: Soft, Nontender, Nondistended; Bowel sounds present  EXTREMITIES:  2+ Peripheral Pulses, No clubbing, cyanosis, or edema  LYMPH: No lymphadenopathy noted  SKIN: No rashes or lesions    LABS:                        16.8   8.78  )-----------( 211      ( 26 Jun 2019 07:59 )             49.4     06-26    136  |  100  |  14  ----------------------------<  101<H>  3.8   |  31  |  0.92    Ca    9.0      26 Jun 2019 07:59  Mg     1.6     06-26          CAPILLARY BLOOD GLUCOSE            Culture - Urine (collected 06-25-19 @ 16:42)  Source: .Urine  Preliminary Report (06-26-19 @ 17:25):    >100,000 CFU/ml Gram Negative Rods    Culture - Blood (collected 06-25-19 @ 16:13)  Source: .Blood  Preliminary Report (06-26-19 @ 17:01):    No growth to date.    Culture - Blood (collected 06-25-19 @ 16:13)  Source: .Blood  Preliminary Report (06-26-19 @ 17:01):    No growth to date.        I&O's Summary    26 Jun 2019 07:01  -  27 Jun 2019 07:00  --------------------------------------------------------  IN: 0 mL / OUT: 800 mL / NET: -800 mL        RADIOLOGY & ADDITIONAL TESTS:    Imaging Personally Reviewed:  [x ] YES  [ ] NO    Consultant(s) Notes Reviewed:  [x ] YES  [ ] NO    Care Discussed with Consultants/Other Providers [ x] YES  [ ] NO
Patient is a 73y old  Male who presents with a chief complaint of AMS (2019 06:09)      INTERVAL HPI/OVERNIGHT EVENTS:pt stays confused     Home Medications:  Artificial Tears Plus ophthalmic solution: 1 drop(s) to each affected eye 2 times a day (2019 10:22)  Coreg 12.5 mg oral tablet: 1 tab(s) orally 2 times a day (2019 09:11)  donepezil 10 mg oral tablet: 1 tab(s) orally once a day (at bedtime) (2019 09:11)  Eliquis 2.5 mg oral tablet: 1 tab(s) orally 2 times a day (2019 09:11)  enalapril 10 mg oral tablet: 1 tab(s) orally once a day (2019 09:11)  hydroCHLOROthiazide 12.5 mg oral tablet: 1 tab(s) orally once a day (2019 10:22)  ketorolac ophthalmic: 0.5  to each affected eye (2019 10:22)  mirtazapine 15 mg oral tablet: 1 tab(s) orally once a day (at bedtime) (2019 09:11)  Mobic 7.5 mg oral tablet: 1 tab(s) orally once a day (2019 10:22)  QUEtiapine 100 mg oral tablet: 1 tab(s) orally 2 times a day (2019 10:22)  Vitamin D2: 2000 unit(s) orally once a day (2019 09:11)      MEDICATIONS  (STANDING):  apixaban 5 milliGRAM(s) Oral every 12 hours  artificial tears (preservative free) Ophthalmic Solution 1 Drop(s) Both EYES two times a day  carvedilol 12.5 milliGRAM(s) Oral every 12 hours  donepezil 10 milliGRAM(s) Oral at bedtime  enalapril 10 milliGRAM(s) Oral daily  ketorolac 0.5% Ophthalmic Solution 1 Drop(s) Left EYE daily  mirtazapine 15 milliGRAM(s) Oral at bedtime  QUEtiapine 100 milliGRAM(s) Oral two times a day    MEDICATIONS  (PRN):  guaiFENesin   Syrup  (Sugar-Free) 200 milliGRAM(s) Oral every 6 hours PRN Cough      Allergies    No Known Allergies    Intolerances        REVIEW OF SYSTEMS:  confused denies any pain cough or SOB  Vital Signs Last 24 Hrs  T(C): 36.8 (2019 07:56), Max: 37.2 (2019 17:04)  T(F): 98.3 (2019 07:56), Max: 98.9 (2019 17:04)  HR: 68 (2019 07:56) (61 - 78)  BP: 115/68 (2019 07:56) (91/58 - 165/83)  BP(mean): --  RR: 18 (2019 07:56) (16 - 18)  SpO2: 99% (2019 07:56) (95% - 99%)    PHYSICAL EXAM:  GENERAL:  well-groomed, well-developed  HEAD:  Atraumatic, Normocephalic  EYES: EOMI, PERRLA, conjunctiva and sclera clear  ENMT: Moist mucous membranes,   NECK: Supple, No JVD, Normal thyroid  NERVOUS SYSTEM:  Alert & Oriented X2, poor memory and concentration; Motor Strength 5/5 B/L upper and lower extremities; DTRs 2+ intact and symmetric, confused easily agitated  CHEST/LUNG: Clear to percussion bilaterally; No rales, rhonchi, wheezing, or rubs  HEART: Regular rate and rhythm; No murmurs, rubs, or gallops  ABDOMEN: Soft, Nontender, Nondistended; Bowel sounds present  EXTREMITIES:  2+ Peripheral Pulses, No clubbing, cyanosis, or edema  SKIN: No rashes or lesions    LABS:                        16.8   8.78  )-----------( 211      ( 2019 07:59 )             49.4         136  |  100  |  14  ----------------------------<  101<H>  3.8   |  31  |  0.92    Ca    9.0      2019 07:59  Mg     1.6         TPro  7.1  /  Alb  3.4  /  TBili  0.7  /  DBili  x   /  AST  21  /  ALT  20  /  AlkPhos  91      PT/INR - ( 2019 09:37 )   PT: 12.7 sec;   INR: 1.16 ratio         PTT - ( 2019 09:37 )  PTT:35.2 sec  Urinalysis Basic - ( 2019 09:34 )    Color: Yellow / Appearance: Clear / S.010 / pH: x  Gluc: x / Ketone: Negative  / Bili: Negative / Urobili: Negative mg/dL   Blood: x / Protein: Negative mg/dL / Nitrite: Positive   Leuk Esterase: Moderate / RBC: 0-2 /HPF / WBC 6-10   Sq Epi: x / Non Sq Epi: Few / Bacteria: Moderate      CAPILLARY BLOOD GLUCOSE              I&O's Summary    2019 07:01  -  2019 07:00  --------------------------------------------------------  IN: 300 mL / OUT: 300 mL / NET: 0 mL        RADIOLOGY & ADDITIONAL TESTS:    Imaging Personally Reviewed:  [x ] YES  [ ] NO    Consultant(s) Notes Reviewed:  [x ] YES  [ ] NO    Care Discussed with Consultants/Other Providers [ x] YES  [ ] NO

## 2019-06-29 NOTE — PROGRESS NOTE ADULT - NSHPATTENDINGPLANDISCUSS_GEN_ALL_CORE
ED provoder staff, HCP, Dr mccormick , DR Solitario Araujo
ED provoder staff, HCP, Dr mccormick , DR Solitario Araujo
staff, HCP, Dr mccormick , DR Solitario Araujo
staff, HCP, Dr mccormick , DR Solitario Araujo

## 2019-06-29 NOTE — DISCHARGE NOTE PROVIDER - HOSPITAL COURSE
73 yr old from [facility] with PMH of  AFIB,AICD ANXIETY, dementia with behavioural disorder,CVA,HTN, bib ems from [facility] for agitation since overnight. pt denies pain sob, nausea. per aide, no vomiting, diarrhea, cough. no further hx available.Has H/o recurrent hospital visits for aggressive behaviour.     In /62, RR 14, afebrile, confused , X ray chest left basal infiltrate, pt is on eliquis.    HCP by bed side, aware of aggressive behaviour of patient.    Admitted for AMS likely worsening Dementia with behavioural disorder with possible LLL infiltrate, advised CT chest by Pulmonary Dr Trivedi, Neuro and psych consult called, will continue home meds and empirical abx, biomarkers for PNA ordered.Mild hyponatremia resolved with hydration, afib stable    PNA PE, UTI ruled out no evidence of infection, psych and neuro consult pending, [facility] will not take him back till he is calm.pt is calm, but has orophryngeal dysphagia as per swallowing assesement, MBS ordered which could not becompleted due to patient becoming agitated ,  repeat bed side assesement- no dysphagia tolerates reg diet, urine culture E coli asymptomatic bacteruria discussed with Dr Reyes ID no need for ABX,seroquel increased pt improved stable for DC back to [facility]        Advanced care planning discussed with patient/family. Advaced care planning forms reviewed,discussed /completed, 20 min spent    Pt is DNR    60 minutes spent on this visit, 50% visit time spent in care co-ordination with other attendings and counselling patient

## 2019-06-29 NOTE — PROGRESS NOTE ADULT - PROBLEM SELECTOR PLAN 3
rate controll and eliquis

## 2019-06-29 NOTE — DISCHARGE NOTE PROVIDER - NSDCCPCAREPLAN_GEN_ALL_CORE_FT
PRINCIPAL DISCHARGE DIAGNOSIS  Diagnosis: Dementia with behavioral disturbance, unspecified dementia type  Assessment and Plan of Treatment: improved      SECONDARY DISCHARGE DIAGNOSES  Diagnosis: UTI (urinary tract infection)  Assessment and Plan of Treatment:

## 2019-06-29 NOTE — PROGRESS NOTE ADULT - PROBLEM SELECTOR PLAN 4
supportive care neuro and psych consult

## 2019-06-29 NOTE — PROGRESS NOTE ADULT - PROBLEM SELECTOR PROBLEM 2
Lower resp. tract infection

## 2019-06-29 NOTE — PROGRESS NOTE ADULT - PROVIDER SPECIALTY LIST ADULT
Cardiology
Internal Medicine
Neurology
Pulmonology
Pulmonology
Internal Medicine
Pulmonology
Pulmonology
Internal Medicine
Internal Medicine

## 2019-06-29 NOTE — PROGRESS NOTE ADULT - PROBLEM SELECTOR PROBLEM 1
Agitation
Dementia
Dementia with behavioral disturbance, unspecified dementia type
Dementia with behavioral disturbance, unspecified dementia type
Agitation
Dementia with behavioral disturbance, unspecified dementia type
Agitation
Agitation

## 2019-06-29 NOTE — PROGRESS NOTE ADULT - ASSESSMENT
73 yr old from [facility] with PMH of  AFIB,AICD ANXIETY, dementia with behavioural disorder,CVA,HTN, bib ems from [facility] for agitation since overnight. pt denies pain sob, nausea. per aide, no vomiting, diarrhea, cough. no further hx available.Has H/o recurrent hospital visits for aggressive behaviour.   In /62, RR 14, afebrile, confused , X ray chest left basal infiltrate, pt is on eliquis.  HCP by bed side, aware of aggressive behaviour of patient.  Admitted for AMS likely worsening Dementia with behavioural disorder with possible LLL infiltrate, advised CT chest by Pulmonary Dr Trivedi, Neuro and psych consult called, will continue home meds and empirical abx, biomarkers for PNA ordered.  PNA PE, UTI ruled out no evidence of infection, psych and neuro consult pending, [facility] will not take him back till he is calm.  Advanced care planning discussed with patient/family. Advaced care planning forms reviewed,discussed /completed, 20 min spent  Pt is DNR  45  minutes spent on this visit, 50% visit time spent in care co-ordination with other attendings and counselling patient
73 yr old from [facility] with PMH of  AFIB,AICD ANXIETY, dementia with behavioural disorder,CVA,HTN, bib ems from [facility] for agitation since overnight. pt denies pain sob, nausea. per aide, no vomiting, diarrhea, cough. no further hx available.Has H/o recurrent hospital visits for aggressive behaviour.   In /62, RR 14, afebrile, confused , X ray chest left basal infiltrate, pt is on eliquis.  HCP by bed side, aware of aggressive behaviour of patient.  Admitted for AMS likely worsening Dementia with behavioural disorder with possible LLL infiltrate, advised CT chest by Pulmonary Dr Trivedi, Neuro and psych consult called, will continue home meds and empirical abx, biomarkers for PNA ordered.  PNA PE, UTI ruled out no evidence of infection, psych and neuro consult pending, care home will not take him back till he is calm.pt is calm, but has orophryngeal dysphagia as per swallowing assesement, MBS ordered which could not becompleted due to patient becoming agitated , will have repeat bed side assesement, urine culture E coli asymptomatic bacteruria discussed with Dr Reyes ID no need for ABX, DCplan depending upon result  Advanced care planning discussed with patient/family. Advaced care planning forms reviewed,discussed /completed, 20 min spent  Pt is DNR  45  minutes spent on this visit, 50% visit time spent in care co-ordination with other attendings and counselling patient
The patient is a 73 year old male with a history of HTN, CVA, AF, ICD, dementia who presents with agitation.    Plan:  - Continue carvedilol 12.5 mg bid  - Continue apixaban 5 mg bid (based on weight and renal function)  - Continue enalapril 10 mg daily  - CTA chest negative for PE, no evidence of PNA  - Off of antibiotics  - Discharge planning
73 yr old from skilled [facility] with PMH of  AFIB,AICD ANXIETY, dementia with behavioural disorder,CVA,HTN, bib ems from [facility] for agitation since overnight. pt denies pain sob, nausea. per aide, no vomiting, diarrhea, cough. no further hx available.Has H/o recurrent hospital visits for aggressive behaviour.   In /62, RR 14, afebrile, confused , X ray chest left basal infiltrate, pt is on eliquis.  HCP by bed side, aware of aggressive behaviour of patient.  Admitted for AMS likely worsening Dementia with behavioural disorder with possible LLL infiltrate, advised CT chest by Pulmonary Dr Trivedi, Neuro and psych consult called, will continue home meds and empirical abx, biomarkers for PNA ordered.Mild hyponatremia resolved with hydration, afib stable  PNA PE, UTI ruled out no evidence of infection, psych and neuro consult pending, skilled nursing will not take him back till he is calm.pt is calm, but has orophryngeal dysphagia as per swallowing assesement, MBS ordered which could not becompleted due to patient becoming agitated ,  repeat bed side assesement- no dysphagia tolerates reg diet, urine culture E coli asymptomatic bacteruria discussed with Dr Reyes ID no need for ABX,seroquel increased pt improved stable for DC back to [facility]    Advanced care planning discussed with patient/family. Advaced care planning forms reviewed,discussed /completed, 20 min spent  Pt is DNR  60 minutes spent on this visit, 50% visit time spent in care co-ordination with other attendings and counselling patient
73 yr old from [facility] with PMH of  AFIB,AICD ANXIETY, dementia with behavioural disorder,CVA,HTN, bib ems from [facility] for agitation since overnight. pt denies pain sob, nausea. per aide, no vomiting, diarrhea, cough. no further hx available.Has H/o recurrent hospital visits for aggressive behaviour.   In /62, RR 14, afebrile, confused , X ray chest left basal infiltrate, pt is on eliquis.  HCP by bed side, aware of aggressive behaviour of patient.  Admitted for AMS likely worsening Dementia with behavioural disorder with possible LLL infiltrate, advised CT chest by Pulmonary Dr Trivedi, Neuro and psych consult called, will continue home meds and empirical abx, biomarkers for PNA ordered.  PNA PE, UTI ruled out no evidence of infection, psych and neuro consult pending, [facility] will not take him back till he is calm.pt is calm, but has orophryngeal dysphagia as per swallowing assesement, MBS ordered, DCplan depending upon result  Advanced care planning discussed with patient/family. Advaced care planning forms reviewed,discussed /completed, 20 min spent  Pt is DNR  45  minutes spent on this visit, 50% visit time spent in care co-ordination with other attendings and counselling patient

## 2019-06-30 ENCOUNTER — EMERGENCY (EMERGENCY)
Facility: HOSPITAL | Age: 74
LOS: 1 days | Discharge: HOME | End: 2019-06-30
Attending: EMERGENCY MEDICINE | Admitting: EMERGENCY MEDICINE
Payer: MEDICARE

## 2019-06-30 VITALS
RESPIRATION RATE: 18 BRPM | SYSTOLIC BLOOD PRESSURE: 116 MMHG | OXYGEN SATURATION: 95 % | HEIGHT: 68 IN | WEIGHT: 184.97 LBS | DIASTOLIC BLOOD PRESSURE: 79 MMHG | TEMPERATURE: 98 F | HEART RATE: 66 BPM

## 2019-06-30 LAB
CULTURE RESULTS: SIGNIFICANT CHANGE UP
CULTURE RESULTS: SIGNIFICANT CHANGE UP
SPECIMEN SOURCE: SIGNIFICANT CHANGE UP
SPECIMEN SOURCE: SIGNIFICANT CHANGE UP

## 2019-06-30 PROCEDURE — 99283 EMERGENCY DEPT VISIT LOW MDM: CPT

## 2019-06-30 NOTE — ED PROVIDER NOTE - CLINICAL SUMMARY MEDICAL DECISION MAKING FREE TEXT BOX
pt 72 yo m sent from [facility] for agitation this afternoon. pt was discharged yesterday from [facility] for UTI and aggression. as per  note seroquel was increased from 75mg TID to 100mg TID. when speaking with the nurse at [facility] pt was already on 100mg TID.

## 2019-06-30 NOTE — ED ADULT NURSE NOTE - ASSOCIATED SYMPTOMS
"Agitation" per [place]. Pt states lower left back pain when bending over. "Agitation" per [facility]. Pt states lower left back pain when bending over.

## 2019-06-30 NOTE — ED ADULT NURSE NOTE - PMH
Anxiety    Atrial fibrillation    Cerebrovascular accident (CVA)    Dementia    Depression    Hypertension OP psych f/u at [facility]- [phone]

## 2019-06-30 NOTE — ED PROVIDER NOTE - ATTENDING CONTRIBUTION TO CARE
Gilles with [facility]. pt 72 yo m sent from [facility] for agitation this afternoon. pt was discharged yesterday from [facility] for UTI and aggression. as per  note seroquel was increased from 75mg TID to 100mg TID. when speaking with the nurse at [facility] pt was already on 100mg TID. Therefore we will increase by 25mg to 125mg. Patient behaving in our ED. Had a sandwich. He is calm and cooperative. Stable for d/c.   I performed a face to face bedside interview with patient regarding history of present illness, review of symptoms and past medical history. I completed an independent physical exam.  I have discussed the patient's plan of care with Physician Assistant (PA). I agree with note as stated above, having amended the EMR as needed to reflect my findings.   This includes History of Present Illness, HIV, Past Medical/Surgical/Family/Social History, Allergies and Home Medications, Review of Systems, Physical Exam, and any Progress Notes during the time I functioned as the attending physician for this patient.

## 2019-06-30 NOTE — ED PROVIDER NOTE - OBJECTIVE STATEMENT
pt 74 yo m sent from [facility] for agitation this afternoon. pt was discharged yesterday from [facility] for UTI and aggression. as per  note seroquel was increased from 75mg TID to 100mg TID pt 74 yo m sent from [facility] for agitation this afternoon. pt was discharged yesterday from [facility] for UTI and aggression. as per  note seroquel was increased from 75mg TID to 100mg TID. when speaking with the nurse at [facility] pt was already on 100mg TID.

## 2019-06-30 NOTE — ED PROVIDER NOTE - NSFOLLOWUPINSTRUCTIONS_ED_ALL_ED_FT
Follow up with the primary care provider in 24-48 hours  Increase Seroquel to 125mg 3 times a day and follow up with original prescribed  Take Donepezil in the AM instead of at bedtime  Any worsening of symptoms or new concerning symptoms return to the ED

## 2019-06-30 NOTE — ED ADULT NURSE NOTE - OBJECTIVE STATEMENT
Pt arrived to the ER from [place]. Pt states "They brought me here because I have lower back pain when I bend over, but I am in no pain now. " Pt states he has left lower back pain when bending over. Pt denies n Pt arrived to the ER from [place]. Pt states "They brought me here because I have lower back pain when I bend over, but I am in no pain now. " Pt states he has left lower back pain when bending over. Pt denies any pain, nausea, vomiting, fever, chills, and urinary symptoms. Pt arrived to the ER from [facility]. Pt states "They brought me here because I have lower back pain when I bend over, but I am in no pain now. " Pt states he has left lower back pain when bending over. Pt denies any pain, nausea, vomiting, fever, chills, and urinary symptoms.

## 2019-06-30 NOTE — ED PROVIDER NOTE - PHYSICAL EXAMINATION
General:     NAD, laying comfortably in bed  Eyes: PERRL  Head:     NC/AT, EOMI, oral mucosa moist  Neck:     trachea midline  Lungs:     no distress  CVS:     RRR  Abd:     +BS, s/nt/nd  Ext:   no deformities   Neuro: not currently agitated, speaking in full sentences, alert

## 2019-06-30 NOTE — ED ADULT NURSE NOTE - CHPI ED NUR SYMPTOMS NEG
no chills/no decreased eating/drinking/no dizziness/no nausea/no pain/no weakness/no fever/no tingling/no vomiting

## 2021-07-20 NOTE — ED PROVIDER NOTE - CPE EDP EYES NORM
Wear the sling and use Voltaren as needed for discomfort.  Follow-up with orthopedic doctors for further evaluation and pain if it is persistent.        
normal...

## 2023-09-27 NOTE — ED ADULT NURSE NOTE - NS ED NURSE LEVEL OF CONSCIOUSNESS MENTAL STATUS
Awake/Alert/Cooperative
 and daughter, in a private house with 3 steps to enter with railing and no steps inside/spouse

## 2023-10-22 NOTE — PATIENT PROFILE ADULT - NSPRESCRUSEDDRG_GEN_A_NUR
No

## 2024-04-29 NOTE — ED ADULT NURSE NOTE - CADM POA URETHRAL CATHETER
HIRAM has again attempted to reach pt/dgt as per Dr Valente's request re pt's financial situation and living with a friend.   HIRAM had to leave a message at [phone] for pt/dgt to return SW call.   HIRAM also also called pt's son in law Annel Villanueva another Emergency Contact [phone] but had to leave a message for pt to return SW call.  HIRAM also called pt's son Mick Rodriguez [phone] but had to leave a message.  SW to remain available to assist as indicated.  
No

## 2024-05-22 NOTE — ED PROVIDER NOTE - MDM ORDERS SUBMITTED SELECTION
[Never] : never [TextBox_4] : This visit was provided via telehealth using real-time 2-way audio visual technology. The patient, LEBRON TERRY , was located at home, [address] [place] [address]  at the time of the visit. The provider, Neto Das, was located at office [address] [place] [address] at the time of the visit.    The patient, Mr. LEBRON TERRY  and Physician Neto James participated in the telehealth encounter.  Verbal consent obtained by  from patient  Here for results of overnight O2 test having some sob unable to come into office does telemed with cardio   Is bed bound and aide notes SOB.  Lying flat noted to have some SOB. Better on side. No cough or wheezing.  minimal snoring.     [facility].  Last hospitalization 2022 for atrial fib.   In 2022 fell and dx. drop foot.  takes PRN diuretic   Still On amiodarone since 2022. Labs/Imaging Studies/EKG

## 2025-02-18 NOTE — ED ADULT TRIAGE NOTE - CCCP TRG CHIEF CMPLNT
Attempted to reach patient for:Follow up call after ER visit.  Chart Review: Patient went into the ER for shoulder pain, no acute shoulder findings.  Outcome: Left voicemail message with Vivian (activated POA) requesting a  return call and provided the number to call: [phone].  Next Follow Up: One week.  
aggressive behavior